# Patient Record
Sex: FEMALE | Race: WHITE | NOT HISPANIC OR LATINO | Employment: OTHER | URBAN - METROPOLITAN AREA
[De-identification: names, ages, dates, MRNs, and addresses within clinical notes are randomized per-mention and may not be internally consistent; named-entity substitution may affect disease eponyms.]

---

## 2019-01-03 ENCOUNTER — OFFICE VISIT (OUTPATIENT)
Dept: ORTHOPEDICS | Facility: CLINIC | Age: 79
End: 2019-01-03
Payer: MEDICARE

## 2019-01-03 VITALS
DIASTOLIC BLOOD PRESSURE: 60 MMHG | WEIGHT: 120 LBS | HEIGHT: 59 IN | BODY MASS INDEX: 24.19 KG/M2 | HEART RATE: 73 BPM | SYSTOLIC BLOOD PRESSURE: 160 MMHG

## 2019-01-03 DIAGNOSIS — M17.12 PRIMARY OSTEOARTHRITIS OF LEFT KNEE: Primary | ICD-10-CM

## 2019-01-03 PROCEDURE — 73562 X-RAY EXAM OF KNEE 3: CPT | Mod: LT,,, | Performed by: ORTHOPAEDIC SURGERY

## 2019-01-03 PROCEDURE — 99203 PR OFFICE/OUTPT VISIT, NEW, LEVL III, 30-44 MIN: ICD-10-PCS | Mod: 25,,, | Performed by: ORTHOPAEDIC SURGERY

## 2019-01-03 PROCEDURE — 20610 LARGE JOINT ASPIRATION/INJECTION: L KNEE: ICD-10-PCS | Mod: LT,,, | Performed by: ORTHOPAEDIC SURGERY

## 2019-01-03 PROCEDURE — 20610 DRAIN/INJ JOINT/BURSA W/O US: CPT | Mod: LT,,, | Performed by: ORTHOPAEDIC SURGERY

## 2019-01-03 PROCEDURE — 73562 PR  X-RAY KNEE 3 VIEW: ICD-10-PCS | Mod: LT,,, | Performed by: ORTHOPAEDIC SURGERY

## 2019-01-03 PROCEDURE — 99203 OFFICE O/P NEW LOW 30 MIN: CPT | Mod: 25,,, | Performed by: ORTHOPAEDIC SURGERY

## 2019-01-03 RX ORDER — LEVOTHYROXINE SODIUM 50 UG/1
50 TABLET ORAL DAILY
COMMUNITY
End: 2019-08-29 | Stop reason: SDUPTHER

## 2019-01-03 RX ORDER — LEVOTHYROXINE SODIUM 75 UG/1
75 TABLET ORAL DAILY
COMMUNITY
End: 2019-08-29 | Stop reason: SDUPTHER

## 2019-01-03 RX ORDER — METHYLPREDNISOLONE ACETATE 40 MG/ML
40 INJECTION, SUSPENSION INTRA-ARTICULAR; INTRALESIONAL; INTRAMUSCULAR; SOFT TISSUE
Status: DISCONTINUED | OUTPATIENT
Start: 2019-01-03 | End: 2019-01-03 | Stop reason: HOSPADM

## 2019-01-03 RX ADMIN — METHYLPREDNISOLONE ACETATE 40 MG: 40 INJECTION, SUSPENSION INTRA-ARTICULAR; INTRALESIONAL; INTRAMUSCULAR; SOFT TISSUE at 11:01

## 2019-01-03 NOTE — PROGRESS NOTES
[facility] ORTHOPEDICS    Subjective:     Chief Complaint:   Chief Complaint   Patient presents with    Left Knee - Pain     Left knee pain and  behind the knee with swelling  since . No injury       Past Medical History:   Diagnosis Date    Thyroid disease        Past Surgical History:   Procedure Laterality Date     SECTION      HYSTERECTOMY      TONSILLECTOMY         Current Outpatient Medications   Medication Sig    levothyroxine (SYNTHROID) 50 MCG tablet Take 50 mcg by mouth once daily.    levothyroxine (SYNTHROID) 75 MCG tablet Take 75 mcg by mouth once daily.     No current facility-administered medications for this visit.        Review of patient's allergies indicates:   Allergen Reactions    Keflex [cephalexin]        History reviewed. No pertinent family history.    Social History     Socioeconomic History    Marital status:      Spouse name: Not on file    Number of children: Not on file    Years of education: Not on file    Highest education level: Not on file   Social Needs    Financial resource strain: Not on file    Food insecurity - worry: Not on file    Food insecurity - inability: Not on file    Transportation needs - medical: Not on file    Transportation needs - non-medical: Not on file   Occupational History    Not on file   Tobacco Use    Smoking status: Never Smoker    Smokeless tobacco: Never Used   Substance and Sexual Activity    Alcohol use: Not on file    Drug use: Not on file    Sexual activity: Not on file   Other Topics Concern    Not on file   Social History Narrative    Not on file       History of present illness: Patient comes in today with a chief complaint of left knee pain. She's had long-standing left knee pain but it's been severe since . Patient has a 2+ effusion. She has a lot of pain with patellar grind's. She is range of motion from 0-115 degrees symmetrically. She has no effusion on the right knee. Straight  leg raises are negative.      Review of Systems:    Constitution: Negative for chills, fever, and sweats.  Negative for unexplained weight loss.    HENT:  Negative for headaches and blurry vision.    Cardiovascular:Negative for chest pain or irregular heart beat. Negative for hypertension.    Respiratory:  Negative for cough and shortness of breath.    Gastrointestinal: Negative for abdominal pain, heartburn, melena, nausea, and vomitting.    Genitourinary:  Negative bladder incontinence and dysuria.    Musculoskeletal:  See HPI for details.     Neurological: Negative for numbness.    Psychiatric/Behavioral: Negative for depression.  The patient is not nervous/anxious.      Endocrine: Negative for polyuria    Hematologic/Lymphatic: Negative for bleeding problem.  Does not bruise/bleed easily.    Skin: Negative for poor would healing and rash    Objective:      Physical Examination:    Vital Signs:    Vitals:    01/03/19 1128   BP: (!) 160/60   Pulse: 73       Body mass index is 24.24 kg/m².    This a well-developed, well nourished patient in no acute distress.  They are alert and oriented and cooperative to examination.        AP lateral and sunrise views of the left knee demonstrate bone-on-bone osteophyte arthritis of the left knee  Pertinent New Results:    XRAY Report / Interpretation:   See above    Assessment/Plan:      *Bone-on-bone osteoarthritis left knee. I injected the left knee with Depo-Medrol and lidocaine. She will follow-up in one month      This note was created using Dragon voice recognition software that occasionally misinterpreted phrases or words.

## 2019-01-03 NOTE — PROCEDURES
Large Joint Aspiration/Injection: L knee  Date/Time: 1/3/2019 11:53 AM  Performed by: Elpidio Ayala MD  Authorized by: Elpidio Ayala MD     Consent Done?:  Yes (Verbal)  Indications:  Pain  Procedure site marked: Yes    Timeout: Prior to procedure the correct patient, procedure, and site was verified      Location:  Knee  Site:  L knee  Prep: Patient was prepped and draped in usual sterile fashion    Needle size:  25 G  Medications:  40 mg methylPREDNISolone acetate 40 mg/mL; 40 mg methylPREDNISolone acetate 40 mg/mL  Patient tolerance:  Patient tolerated the procedure well with no immediate complications

## 2019-04-02 LAB
CHOL/HDLC RATIO: 2.5
CHOLESTEROL, TOTAL: 199
HDLC SERPL-MCNC: 79 MG/DL
LDLC SERPL CALC-MCNC: 106 MG/DL (ref 0–160)
NON HDL CHOL (CALC): 120
TRIGLYCERIDES: 49

## 2019-04-15 ENCOUNTER — PATIENT OUTREACH (OUTPATIENT)
Dept: ADMINISTRATIVE | Facility: HOSPITAL | Age: 79
End: 2019-04-15

## 2019-04-29 ENCOUNTER — LAB VISIT (OUTPATIENT)
Dept: LAB | Facility: HOSPITAL | Age: 79
End: 2019-04-29
Attending: NURSE PRACTITIONER
Payer: MEDICARE

## 2019-04-29 ENCOUNTER — HOSPITAL ENCOUNTER (OUTPATIENT)
Dept: RADIOLOGY | Facility: HOSPITAL | Age: 79
Discharge: HOME | End: 2019-04-29
Attending: NURSE PRACTITIONER
Payer: MEDICARE

## 2019-04-29 ENCOUNTER — OFFICE VISIT (OUTPATIENT)
Dept: FAMILY MEDICINE | Facility: CLINIC | Age: 79
End: 2019-04-29
Payer: MEDICARE

## 2019-04-29 VITALS
HEIGHT: 59 IN | DIASTOLIC BLOOD PRESSURE: 78 MMHG | TEMPERATURE: 98 F | HEART RATE: 80 BPM | OXYGEN SATURATION: 97 % | SYSTOLIC BLOOD PRESSURE: 152 MMHG | BODY MASS INDEX: 24.22 KG/M2 | WEIGHT: 120.13 LBS

## 2019-04-29 DIAGNOSIS — F41.9 ANXIETY: ICD-10-CM

## 2019-04-29 DIAGNOSIS — R03.0 ELEVATED BP WITHOUT DIAGNOSIS OF HYPERTENSION: ICD-10-CM

## 2019-04-29 DIAGNOSIS — R73.03 PRE-DIABETES: ICD-10-CM

## 2019-04-29 DIAGNOSIS — R22.41 LUMP OF RIGHT THIGH: ICD-10-CM

## 2019-04-29 DIAGNOSIS — R79.89 ELEVATED LFTS: ICD-10-CM

## 2019-04-29 DIAGNOSIS — Z76.89 ENCOUNTER TO ESTABLISH CARE: Primary | ICD-10-CM

## 2019-04-29 DIAGNOSIS — E04.1 THYROID NODULE: ICD-10-CM

## 2019-04-29 PROCEDURE — 99999 PR PBB SHADOW E&M-EST. PATIENT-LVL IV: CPT | Mod: PBBFAC,,, | Performed by: NURSE PRACTITIONER

## 2019-04-29 PROCEDURE — 99204 OFFICE O/P NEW MOD 45 MIN: CPT | Mod: S$PBB,,, | Performed by: NURSE PRACTITIONER

## 2019-04-29 PROCEDURE — 76882 PR  US,EXTREMITY,NONVASCULAR,REAL-TIME IMAGE,LIMITED: ICD-10-PCS | Mod: 26,RT,, | Performed by: RADIOLOGY

## 2019-04-29 PROCEDURE — 99204 PR OFFICE/OUTPT VISIT, NEW, LEVL IV, 45-59 MIN: ICD-10-PCS | Mod: S$PBB,,, | Performed by: NURSE PRACTITIONER

## 2019-04-29 PROCEDURE — 36415 COLL VENOUS BLD VENIPUNCTURE: CPT | Mod: PO

## 2019-04-29 PROCEDURE — 99214 OFFICE O/P EST MOD 30 MIN: CPT | Mod: PBBFAC,PO,25 | Performed by: NURSE PRACTITIONER

## 2019-04-29 PROCEDURE — 99999 PR PBB SHADOW E&M-EST. PATIENT-LVL IV: ICD-10-PCS | Mod: PBBFAC,,, | Performed by: NURSE PRACTITIONER

## 2019-04-29 PROCEDURE — 76999 ECHO EXAMINATION PROCEDURE: CPT | Mod: TC

## 2019-04-29 PROCEDURE — 80053 COMPREHEN METABOLIC PANEL: CPT

## 2019-04-29 PROCEDURE — 76882 US LMTD JT/FCL EVL NVASC XTR: CPT | Mod: 26,RT,, | Performed by: RADIOLOGY

## 2019-04-29 NOTE — PROGRESS NOTES
Subjective:       Patient ID: Sho Lennon is a 79 y.o. female.    Chief Complaint: Mass (right leg )    Ms. Lennon presents today to establish care. Previously under the care of Dr. Eduardo. Sees Dr. Jacobsen for endocrinology, most recent visit was 2 weeks ago. Has a thyroid nodule that is checked every 2 years with an ultrasound. This is scheduled for 5/2. Per patient, on synthroid to help prevent the growth of the thyroid nodule. Had recent lab work at [facility], reviewed in detail with the patient. Copy obtained to scan to chart. Has mammogram and DEXA scheduled for 5/2. BP is slightly elevated today. Never diagnosed with HTN. Stress/anxiety at home-  has Parkinson's. Noticed a lump on her right thigh that has been present for years. More prominent if she lifts her leg. It is not painful and has not changed. No skin changes. Reports she had pneumonia vaccines but is unsure of when. Medical/surgical/family history and medications reviewed. Has had multiple skin cancer removals, some requiring Mohs procedures. Also has known pre-diabetes.     Records request from Dr. Eduardo and Dr. Jacobsen.     Patient Active Problem List   Diagnosis    Thyroid nodule    Pre-diabetes    Anxiety     Review of Systems   Constitutional: Negative for activity change, appetite change, fatigue and fever.   Respiratory: Negative for chest tightness, shortness of breath and wheezing.    Cardiovascular: Negative for chest pain, palpitations and leg swelling.   Gastrointestinal: Negative for abdominal pain, diarrhea, nausea and vomiting.   Endocrine: Negative for polydipsia, polyphagia and polyuria.   Genitourinary: Negative for difficulty urinating.   Musculoskeletal: Negative for arthralgias, back pain and myalgias.   Neurological: Negative for dizziness, weakness and light-headedness.   Psychiatric/Behavioral: The patient is nervous/anxious.        Objective:      Physical Exam   Constitutional: She is oriented to person, place, and  time. She appears well-developed and well-nourished.   Cardiovascular: Normal rate, regular rhythm and normal heart sounds.   Pulmonary/Chest: Effort normal and breath sounds normal.   Musculoskeletal: She exhibits no edema.   Neurological: She is alert and oriented to person, place, and time.   Skin: Skin is warm and dry.   Psychiatric: She has a normal mood and affect.   Nursing note and vitals reviewed.      Assessment:       1. Encounter to establish care    2. Elevated LFTs    3. Thyroid nodule    4. Lump of right thigh    5. Pre-diabetes    6. Elevated BP without diagnosis of hypertension    7. Anxiety        Plan:       Sho was seen today for mass.    Diagnoses and all orders for this visit:    Encounter to establish care    Elevated LFTs  -     Comprehensive metabolic panel; Future  Elevated ALT= 32 on labs 4/2, repeat  Denies use of tylenol of excessive alcohol intake    Thyroid nodule  Continue under the care of endocrinology   U/S scheduled for 5/2  Recent TFTs stable    Lump of right thigh  -     US Soft Tissue Misc; Future  Screen and treat as needed    Pre-diabetes  A1C 5.7  Discussed importance of diet and regular exercise to help with blood glucose control    Elevated BP without diagnosis of hypertension  I counseled the patient on HTN education, management and recommendations.  I recommended weight loss toward a BMI < 25, avoidance of salt and the DASH diet, regular cardio exercise a minimum of 150 minutes per week and medications if indicated. The goal is < 140/90 unless otherwise specified.  F/U 4 weeks nurse BP check     Anxiety  Offered medications, psychology referral  Will consider and notify clinic    F/U to establish care with Dr. Lewis

## 2019-04-30 ENCOUNTER — TELEPHONE (OUTPATIENT)
Dept: FAMILY MEDICINE | Facility: CLINIC | Age: 79
End: 2019-04-30

## 2019-04-30 DIAGNOSIS — R22.41 LUMP OF RIGHT THIGH: Primary | ICD-10-CM

## 2019-04-30 LAB
ALBUMIN SERPL BCP-MCNC: 3.8 G/DL (ref 3.5–5.2)
ALP SERPL-CCNC: 62 U/L (ref 55–135)
ALT SERPL W/O P-5'-P-CCNC: 24 U/L (ref 10–44)
ANION GAP SERPL CALC-SCNC: 10 MMOL/L (ref 8–16)
AST SERPL-CCNC: 22 U/L (ref 10–40)
BILIRUB SERPL-MCNC: 0.6 MG/DL (ref 0.1–1)
BUN SERPL-MCNC: 22 MG/DL (ref 8–23)
CALCIUM SERPL-MCNC: 10.1 MG/DL (ref 8.7–10.5)
CHLORIDE SERPL-SCNC: 105 MMOL/L (ref 95–110)
CO2 SERPL-SCNC: 26 MMOL/L (ref 23–29)
CREAT SERPL-MCNC: 0.7 MG/DL (ref 0.5–1.4)
EST. GFR  (AFRICAN AMERICAN): >60 ML/MIN/1.73 M^2
EST. GFR  (NON AFRICAN AMERICAN): >60 ML/MIN/1.73 M^2
GLUCOSE SERPL-MCNC: 110 MG/DL (ref 70–110)
POTASSIUM SERPL-SCNC: 4.1 MMOL/L (ref 3.5–5.1)
PROT SERPL-MCNC: 6.9 G/DL (ref 6–8.4)
SODIUM SERPL-SCNC: 141 MMOL/L (ref 136–145)

## 2019-04-30 NOTE — TELEPHONE ENCOUNTER
----- Message from Iesha Bhandari NP sent at 4/30/2019  8:44 AM CDT -----  U/S showed 3 cm mass along the right thigh, possible lipoma or fat trauma/ necrosis, but overall indeterminate. I have referred her to general surgery for further evaluation.

## 2019-05-01 NOTE — TELEPHONE ENCOUNTER
Left message for patient to return call, sent letter and appointment slip for general surgery appointment.

## 2019-05-01 NOTE — TELEPHONE ENCOUNTER
Spoke with patient regarding her results, patient has been scheduled to see the general surgery on 5/14/ at 3:00pm

## 2019-05-09 ENCOUNTER — CLINICAL SUPPORT (OUTPATIENT)
Dept: URGENT CARE | Facility: CLINIC | Age: 79
End: 2019-05-09
Payer: MEDICARE

## 2019-05-09 VITALS
SYSTOLIC BLOOD PRESSURE: 150 MMHG | HEART RATE: 87 BPM | OXYGEN SATURATION: 98 % | BODY MASS INDEX: 24.27 KG/M2 | WEIGHT: 120.38 LBS | TEMPERATURE: 97 F | HEIGHT: 59 IN | DIASTOLIC BLOOD PRESSURE: 78 MMHG | RESPIRATION RATE: 16 BRPM

## 2019-05-09 DIAGNOSIS — J04.0 LARYNGITIS: ICD-10-CM

## 2019-05-09 DIAGNOSIS — R05.9 COUGH: ICD-10-CM

## 2019-05-09 DIAGNOSIS — R09.82 POST-NASAL DRIP: Primary | ICD-10-CM

## 2019-05-09 PROCEDURE — 99204 PR OFFICE/OUTPT VISIT, NEW, LEVL IV, 45-59 MIN: ICD-10-PCS | Mod: S$GLB,,, | Performed by: NURSE PRACTITIONER

## 2019-05-09 PROCEDURE — 99204 OFFICE O/P NEW MOD 45 MIN: CPT | Mod: S$GLB,,, | Performed by: NURSE PRACTITIONER

## 2019-05-09 RX ORDER — FLUTICASONE PROPIONATE 50 MCG
2 SPRAY, SUSPENSION (ML) NASAL DAILY
Qty: 15.8 ML | Refills: 0 | Status: SHIPPED | OUTPATIENT
Start: 2019-05-09 | End: 2019-08-29 | Stop reason: ALTCHOICE

## 2019-05-09 RX ORDER — CETIRIZINE HYDROCHLORIDE 10 MG/1
10 TABLET ORAL DAILY
Qty: 30 TABLET | Refills: 0 | Status: SHIPPED | OUTPATIENT
Start: 2019-05-09 | End: 2019-05-09 | Stop reason: CLARIF

## 2019-05-09 RX ORDER — CETIRIZINE HYDROCHLORIDE 10 MG/1
10 TABLET ORAL DAILY
Qty: 30 TABLET | Refills: 0 | Status: SHIPPED | OUTPATIENT
Start: 2019-05-09 | End: 2019-06-08

## 2019-05-09 NOTE — PATIENT INSTRUCTIONS
Take your Astelin with the Flonase you are prescribed today.     Laryngitis    Laryngitis is a swelling of the tissues around the vocal cords. Symptoms include a hoarse (scratchy) voice. The voice may be lost completely. It may be caused by a viral illness, such as a head or chest cold. It may also be due to overuse and strain of the voice. Smoking, drinking alcohol, acid reflux, allergies, or inhaling harsh chemicals may also lead to symptoms. This condition will usually resolve in 1-2 weeks.  Home care  · Rest your voice until it recovers. Talk as little as possible. If your symptoms are severe, rest at home for a day or so.  · Breathing cool steam from a humidifier/vaporizer or in a steamy shower may be helpful.  · Drink plenty of fluids to stay well hydrated.  · Do not smoke  Follow-up care  Follow up with your healthcare provider or this facility if you have not improved after one week.  When to seek medical advice  Contact your healthcare provider for any of the following:  · Severe pain with swallowing  · Trouble opening mouth  · Neck swelling, neck pain, or trouble moving neck  · Noisy breathing or trouble breathing  · Fever of 100.4°F (38.ºC) or higher, or as directed by your healthcare provider  · Drooling  · Symptoms do not resolve in 2 weeks  Date Last Reviewed: 4/26/2015  © 5892-0386 [employer]. [address] [place] [address]. All rights reserved. This information is not intended as a substitute for professional medical care. Always follow your healthcare professional's instructions.        When You Have a Sore Throat    A sore throat can be painful. There are many reasons why you may have a sore throat. Your healthcare provider will work with you to find the cause of your sore throat. He or she will also find the best treatment for you.  What causes a sore throat?  Sore throats can be caused or worsened by:  · Cold or flu viruses  · Bacteria  · Irritants such as tobacco  smoke or air pollution  · Acid reflux  A healthy throat  The tonsils are on the sides of the throat near the base of the tongue. They collect viruses and bacteria and help fight infection. The throat (pharynx) is the passage for air. Mucus from the nasal cavity also moves down the passage.  An inflamed throat  The tonsils and pharynx can become inflamed due to a cold or flu virus. Postnasal drip (excess mucus draining from the nasal cavity) can irritate the throat. It can also make the throat or tonsils more likely to be infected by bacteria. Severe, untreated tonsillitis in children or adults can cause a pocket of pus (abscess) to form near the tonsil.  Your evaluation  A medical evaluation can help find the cause of your sore throat. It can also help your healthcare provider choose the best treatment for you. The evaluation may include a health history, physical exam, and diagnostic tests.  Health history  Your healthcare provider may ask you the following:  · How long has the sore throat lasted and how have you been treating it?  · Do you have any other symptoms, such as body aches, fever, or cough?  · Does your sore throat recur? If so, how often? How many days of school or work have you missed because of a sore throat?  · Do you have trouble eating or swallowing?  · Have you been told that you snore or have other sleep problems?  · Do you have bad breath?  · Do you cough up bad-tasting mucus?  Physical exam  During the exam, your healthcare provider checks your ears, nose, and throat for problems. He or she also checks for swelling in the neck, and may listen to your chest.  Possible tests  Other tests your healthcare provider may perform include:  · A throat swab to check for bacteria such as streptococcus (the bacteria that causes strep throat)  · A blood test to check for mononucleosis (a viral infection)  · A chest X-ray to rule out pneumonia, especially if you have a cough  Treating a sore  "throat  Treatment depends on many factors. What is the likely cause? Is the problem recent? Does it keep coming back? In many cases, the best thing to do is to treat the symptoms, rest, and let the problem heal itself. Antibiotics may help clear up some bacterial infections. For cases of severe or recurring tonsillitis, the tonsils may need to be removed.  Relieving your symptoms  · Dont smoke, and avoid secondhand smoke.  · For children, try throat sprays or Popsicles. Adults and older children may try lozenges.  · Drink warm liquids to soothe the throat and help thin mucus. Avoid alcohol, spicy foods, and acidic drinks such as orange juice. These can irritate the throat.  · Gargle with warm saltwater (1 teaspoon of salt to 8 ounces of warm water).  · Use a humidifier to keep air moist and relieve throat dryness.  · Try over-the-counter pain relievers such as acetaminophen or ibuprofen. Use as directed, and dont exceed the recommended dose. Dont give aspirin to children.   Are antibiotics needed?  If your sore throat is due to a bacterial infection, antibiotics may speed healing and prevent complications. Although group A streptococcus ("strep throat" or GAS) is the major treatable infection for a sore throat, GAS causes only 5% to 15% of sore throats in adults who seek medical care. Most sore throats are caused by cold or flu viruses. And antibiotics dont treat viral illness. In fact, using antibiotics when theyre not needed may produce bacteria that are harder to kill. Your healthcare provider will prescribe antibiotics only if he or she thinks they are likely to help.  If antibiotics are prescribed  Take the medicine exactly as directed. Be sure to finish your prescription even if youre feeling better. And be sure to ask your healthcare provider or pharmacist what side effects are common and what to do about them.  Is surgery needed?  In some cases, tonsils need to be removed. This is often done as " outpatient (same-day) surgery. Your healthcare provider may advise removing the tonsils in cases of:  · Several severe bouts of tonsillitis in a year. Severe episodes include those that lead to missed days of school or work, or that need to be treated with antibiotics.  · Tonsillitis that causes breathing problems during sleep  · Tonsillitis caused by food particles collecting in pouches in the tonsils (cryptic tonsillitis)  Call your healthcare provider if any of the following occur:  · Symptoms worsen, or new symptoms develop.  · Swollen tonsils make breathing difficult.  · The pain is severe enough to keep you from drinking liquids.  · A skin rash, hives, or wheezing develops. Any of these could signal an allergic reaction to antibiotics.  · Symptoms dont improve within a week.  · Symptoms dont improve within 2 to 3 days of starting antibiotics.   Date Last Reviewed: 10/1/2016  © 9618-4695 [employer]. [address] [place] [address]. All rights reserved. This information is not intended as a substitute for professional medical care. Always follow your healthcare professional's instructions.        Self-Care for Sore Throats    Sore throats happen for many reasons, such as colds, allergies, and infections caused by viruses or bacteria. In any case, your throat becomes red and sore. Your goal for self-care is to reduce your discomfort while giving your throat a chance to heal.  Moisten and soothe your throat  Tips include the following:  · Try a sip of water first thing after waking up.  · Keep your throat moist by drinking 6 or more glasses of clear liquids every day.  · Run a cool-air humidifier in your room overnight.  · Avoid cigarette smoke.   · Suck on throat lozenges, cough drops, hard candy, ice chips, or frozen fruit-juice bars. Use the sugar-free versions if your diet or medical condition requires them.  Gargle to ease irritation  Gargling every hour or 2 can ease irritation.  Try gargling with 1 of these solutions:  · 1/4 teaspoon of salt in 1/2 cup of warm water  · An over-the-counter anesthetic gargle  Use medicine for more relief  Over-the-counter medicine can reduce sore throat symptoms. Ask your pharmacist if you have questions about which medicine to use:  · Ease pain with anesthetic sprays. Aspirin or an aspirin substitute also helps. Remember, never give aspirin to anyone 18 or younger, or if you are already taking blood thinners.   · For sore throats caused by allergies, try antihistamines to block the allergic reaction.  · Remember: unless a sore throat is caused by a bacterial infection, antibiotics wont help you.  Prevent future sore throats  Prevention tips include the following:  · Stop smoking or reduce contact with secondhand smoke. Smoke irritates the tender throat lining.  · Limit contact with pets and with allergy-causing substances, such as pollen and mold.  · When youre around someone with a sore throat or cold, wash your hands often to keep viruses or bacteria from spreading.  · Dont strain your vocal cords.  Call your healthcare provider  Contact your healthcare provider if you have:  · A temperature over 101°F (38.3°C)  · White spots on the throat  · Great difficulty swallowing  · Trouble breathing  · A skin rash  · Recent exposure to someone else with strep bacteria  · Severe hoarseness and swollen glands in the neck or jaw   Date Last Reviewed: 8/1/2016  © 6106-3367 [employer]. [address] [place] [address], [place] [address]. All rights reserved. This information is not intended as a substitute for professional medical care. Always follow your healthcare professional's instructions.

## 2019-05-09 NOTE — PROGRESS NOTES
"Subjective:       Patient ID: Sho Lennon is a 79 y.o. female.    Vitals:  height is 4' 11" (1.499 m) and weight is 54.6 kg (120 lb 6.4 oz). Her oral temperature is 97.3 °F (36.3 °C). Her blood pressure is 150/78 (abnormal) and her pulse is 87. Her respiration is 16 and oxygen saturation is 98%.     Chief Complaint: Cough (since monday)    Patient complains of sore throat since yesterday. Also reports loss of voice and nonproductive cough. Denies sob, wheezing, chest pain, sinus congestion, nausea, vomiting, diarrhea.     Cough   This is a new problem. The current episode started in the past 7 days. The problem has been gradually worsening. The problem occurs constantly. The cough is non-productive. Associated symptoms include postnasal drip and a sore throat. Pertinent negatives include no chest pain, chills, fever, headaches, myalgias, rash or shortness of breath. Nothing aggravates the symptoms. She has tried OTC cough suppressant for the symptoms. The treatment provided no relief.       Constitution: Negative for chills, fatigue and fever.   HENT: Positive for postnasal drip, sore throat and voice change. Negative for congestion.    Neck: Negative for painful lymph nodes.   Cardiovascular: Negative for chest pain and leg swelling.   Eyes: Negative for double vision and blurred vision.   Respiratory: Positive for cough. Negative for shortness of breath.    Gastrointestinal: Negative for abdominal pain, nausea, vomiting and diarrhea.   Genitourinary: Negative for dysuria, frequency, urgency and history of kidney stones.   Musculoskeletal: Negative for joint pain, joint swelling, muscle cramps and muscle ache.   Skin: Negative for color change, pale, rash and bruising.   Allergic/Immunologic: Negative for seasonal allergies.   Neurological: Negative for dizziness, history of vertigo, light-headedness, passing out and headaches.   Hematologic/Lymphatic: Negative for swollen lymph nodes.   Psychiatric/Behavioral: " Negative for nervous/anxious, sleep disturbance and depression. The patient is not nervous/anxious.        Objective:      Physical Exam   Constitutional: She is oriented to person, place, and time. Vital signs are normal. She appears well-developed and well-nourished. She is cooperative.  Non-toxic appearance. She does not have a sickly appearance. She does not appear ill. No distress.   HENT:   Head: Normocephalic and atraumatic.   Right Ear: Hearing, tympanic membrane, external ear and ear canal normal.   Left Ear: Hearing, tympanic membrane, external ear and ear canal normal.   Nose: Rhinorrhea present. No mucosal edema or nasal deformity. No epistaxis. Right sinus exhibits no maxillary sinus tenderness and no frontal sinus tenderness. Left sinus exhibits no maxillary sinus tenderness and no frontal sinus tenderness.   Mouth/Throat: Uvula is midline and mucous membranes are normal. No trismus in the jaw. Normal dentition. No uvula swelling. Posterior oropharyngeal erythema present.   Post nasal drip noted   Eyes: Conjunctivae and lids are normal. Right eye exhibits no discharge. Left eye exhibits no discharge. No scleral icterus.   Sclera clear bilat   Neck: Trachea normal, normal range of motion, full passive range of motion without pain and phonation normal. Neck supple.   Cardiovascular: Normal rate, regular rhythm, normal heart sounds, intact distal pulses and normal pulses.   Pulmonary/Chest: Effort normal and breath sounds normal. No respiratory distress.   Abdominal: Soft. Normal appearance and bowel sounds are normal. She exhibits no distension, no pulsatile midline mass and no mass. There is no tenderness.   Musculoskeletal: Normal range of motion. She exhibits no edema or deformity.   Neurological: She is alert and oriented to person, place, and time. She exhibits normal muscle tone. Coordination normal. GCS eye subscore is 4. GCS verbal subscore is 5. GCS motor subscore is 6.   Skin: Skin is warm, dry  and intact. No rash noted. She is not diaphoretic. No pallor.   Psychiatric: She has a normal mood and affect. Her speech is normal and behavior is normal. Judgment and thought content normal. Cognition and memory are normal.   Nursing note and vitals reviewed.      Assessment:       1. Post-nasal drip    2. Laryngitis    3. Cough        Plan:       Symptoms suggestive of viral illness, will treat at home symptomatically.  F/U with PCP if symptoms do not improve in 3 days.  Verbalizes understanding they may return for any worsening or change in symptoms.      Post-nasal drip    Laryngitis    Cough    Other orders  -     Discontinue: cetirizine (ZYRTEC) 10 MG tablet; Take 1 tablet (10 mg total) by mouth once daily.  Dispense: 30 tablet; Refill: 0  -     fluticasone propionate (FLONASE) 50 mcg/actuation nasal spray; 2 sprays (100 mcg total) by Each Nare route once daily.  Dispense: 15.8 mL; Refill: 0  -     dexchlorphen-phenylephrine-DM (POLYTUSSIN DM) 1-5-10 mg/5 mL Syrp; Take 5 mLs by mouth every 4 (four) hours as needed.  Dispense: 120 mL; Refill: 0  -     cetirizine (ZYRTEC) 10 MG tablet; Take 1 tablet (10 mg total) by mouth once daily.  Dispense: 30 tablet; Refill: 0

## 2019-05-14 ENCOUNTER — OFFICE VISIT (OUTPATIENT)
Dept: SURGERY | Facility: CLINIC | Age: 79
End: 2019-05-14
Payer: MEDICARE

## 2019-05-14 VITALS
HEIGHT: 59 IN | RESPIRATION RATE: 16 BRPM | BODY MASS INDEX: 23.87 KG/M2 | TEMPERATURE: 98 F | HEART RATE: 94 BPM | DIASTOLIC BLOOD PRESSURE: 81 MMHG | SYSTOLIC BLOOD PRESSURE: 155 MMHG | WEIGHT: 118.38 LBS

## 2019-05-14 DIAGNOSIS — R22.9 SUBCUTANEOUS MASS: Primary | ICD-10-CM

## 2019-05-14 PROCEDURE — 99999 PR PBB SHADOW E&M-EST. PATIENT-LVL III: ICD-10-PCS | Mod: PBBFAC,,, | Performed by: SURGERY

## 2019-05-14 PROCEDURE — 99999 PR PBB SHADOW E&M-EST. PATIENT-LVL III: CPT | Mod: PBBFAC,,, | Performed by: SURGERY

## 2019-05-14 PROCEDURE — 99203 PR OFFICE/OUTPT VISIT, NEW, LEVL III, 30-44 MIN: ICD-10-PCS | Mod: S$PBB,,, | Performed by: SURGERY

## 2019-05-14 PROCEDURE — 99203 OFFICE O/P NEW LOW 30 MIN: CPT | Mod: S$PBB,,, | Performed by: SURGERY

## 2019-05-14 PROCEDURE — 99213 OFFICE O/P EST LOW 20 MIN: CPT | Mod: PBBFAC,PO | Performed by: SURGERY

## 2019-05-14 NOTE — Clinical Note
I saw your patient, Sho Lennon in the office.  Attached are my findings and plan.  Thank you for referring her to my office and if you have any questions please do not hesitate to call my cell [phone].Alfredo Menendez

## 2019-05-14 NOTE — PATIENT INSTRUCTIONS
Surgery is scheduled for 05/20/19 arrival time per the preop nurse.  Preop will call from [phone]  Fasting after midnight  Someone to drive you home      THE PREOP NURSE WILL CALL, SOMETIMES AS LATE AS 4 PM IN THE AFTERNOON THE DAY BEFORE SURGERY.    Bathe the night before and the morning of your procedure with a Chlorhexidine wash such as Hibiclens, can be purchased at most Pharmacy's.    Special Instruction:

## 2019-05-14 NOTE — LETTER
May 15, 2019      Iesha Bhandari, NP  [address]  [facility] [address]           [facility] - General Surgery  [address]  [facility] [address]  Phone: [phone]          Patient: Sho Lennon   MR Number: [id number]   YOB: 1940   Date of Visit: 5/14/2019       Dear Iesha Bhandari:    Thank you for referring Sho Lennon to me for evaluation. Attached you will find relevant portions of my assessment and plan of care.    If you have questions, please do not hesitate to call me. I look forward to following Sho Lennon along with you.    Sincerely,    Long Menendez MD    Enclosure  CC:  No Recipients    If you would like to receive this communication electronically, please contact [email or web address] or [phone] to request more information on [employer] Link access.    For providers and/or their staff who would like to refer a patient to [facility], please contact us through our one-stop-shop provider referral line, [facility], at [phone].    If you feel you have received this communication in error or would no longer like to receive these types of communications, please e-mail [email or web address]

## 2019-05-15 RX ORDER — LIDOCAINE HYDROCHLORIDE 10 MG/ML
1 INJECTION, SOLUTION EPIDURAL; INFILTRATION; INTRACAUDAL; PERINEURAL ONCE
Status: CANCELLED | OUTPATIENT
Start: 2019-05-15 | End: 2019-05-15

## 2019-05-15 RX ORDER — SODIUM CHLORIDE 9 MG/ML
INJECTION, SOLUTION INTRAVENOUS CONTINUOUS
Status: CANCELLED | OUTPATIENT
Start: 2019-05-15

## 2019-05-15 NOTE — PROGRESS NOTES
Subjective:       Patient ID: Sho Lennon is a 79 y.o. female.    Chief Complaint: Consult (lump on right thigh)    HPI  Pleasant 80 yo F referred to me in consultation from Iesha Bhandari for evaluation of lump on her R leg.  Pt notes that lesion has been present for years.  She states that in recent months it has become more painful. She denies any drainage from the area.  No n/v.  No fever/chills. Pt notes that she had an US demonstrating findings suggestive of lipoma.  Given its increasing size and tenderness she desires to have it removed.  Pt is otherwise without complaint.  NO significant PShx.  Pt otherwise healhty  Review of Systems   Constitutional: Negative for activity change, appetite change, fever and unexpected weight change.   Respiratory: Negative for chest tightness, shortness of breath and wheezing.    Cardiovascular: Negative for chest pain.   Gastrointestinal: Negative for abdominal distention, abdominal pain, constipation, diarrhea, nausea and vomiting.   Genitourinary: Negative for difficulty urinating, dysuria and frequency.   Skin: Negative for wound.   Neurological: Negative for dizziness.   Hematological: Negative for adenopathy.   Psychiatric/Behavioral: Negative for agitation.       Objective:      Physical Exam   Constitutional: She is oriented to person, place, and time. She appears well-developed and well-nourished.   HENT:   Head: Normocephalic and atraumatic.   Eyes: Pupils are equal, round, and reactive to light.   Neck: Normal range of motion. Neck supple. No tracheal deviation present. No thyromegaly present.   Cardiovascular: Normal rate, regular rhythm and normal heart sounds.   No murmur heard.  Pulmonary/Chest: Effort normal and breath sounds normal. She exhibits no tenderness.   Abdominal: Soft. Bowel sounds are normal. She exhibits no distension, no abdominal bruit, no pulsatile midline mass and no mass. There is no hepatosplenomegaly. There is no tenderness. There is no  rigidity, no rebound, no guarding, no tenderness at McBurney's point and negative Nguyen's sign. No hernia. Hernia confirmed negative in the ventral area.   Genitourinary: Rectum normal.   Musculoskeletal: Normal range of motion.   Neurological: She is alert and oriented to person, place, and time.   Skin: Skin is warm. No rash noted. No erythema.        Psychiatric: She has a normal mood and affect.   Vitals reviewed.        US; 4/29    FINDINGS:  Targeted ultrasound reveals a 3.1 x 2 cm mass.  This is isoechoic to adjacent tissue and demonstrates no posterior shadowing or increased through transmission. Some margins are well-circumscribed. Others are somewhat difficult to discern due to isoechoic nature of lesion.  Assessment:     Soft tissue mass of R thigh  No diagnosis found.    Plan:       D/w pt.  I have informed pt and her  that this is most likely a benign lesion.  I have offered surgical removal which they desire.  Risks and benefits were d/w pt and informed consent obtained.  Surgery scheduled for Monday

## 2019-05-17 ENCOUNTER — HOSPITAL ENCOUNTER (OUTPATIENT)
Dept: PREADMISSION TESTING | Facility: HOSPITAL | Age: 79
Discharge: HOME | End: 2019-05-17
Attending: SURGERY
Payer: MEDICARE

## 2019-05-17 ENCOUNTER — ANESTHESIA EVENT (OUTPATIENT)
Dept: SURGERY | Facility: HOSPITAL | Age: 79
End: 2019-05-17
Payer: MEDICARE

## 2019-05-17 ENCOUNTER — HOSPITAL ENCOUNTER (OUTPATIENT)
Dept: RADIOLOGY | Facility: HOSPITAL | Age: 79
Discharge: HOME | End: 2019-05-17
Attending: SURGERY
Payer: MEDICARE

## 2019-05-17 VITALS — HEIGHT: 59 IN | BODY MASS INDEX: 24.19 KG/M2 | WEIGHT: 120 LBS

## 2019-05-17 DIAGNOSIS — R22.9 SUBCUTANEOUS MASS: ICD-10-CM

## 2019-05-17 LAB
BASOPHILS # BLD AUTO: 0 K/UL (ref 0–0.2)
BASOPHILS NFR BLD: 0.3 % (ref 0–1.9)
DIFFERENTIAL METHOD: ABNORMAL
EOSINOPHIL # BLD AUTO: 0.1 K/UL (ref 0–0.5)
EOSINOPHIL NFR BLD: 1 % (ref 0–8)
ERYTHROCYTE [DISTWIDTH] IN BLOOD BY AUTOMATED COUNT: 13.6 % (ref 11.5–14.5)
HCT VFR BLD AUTO: 38.2 % (ref 37–48.5)
HGB BLD-MCNC: 12.5 G/DL (ref 12–16)
LYMPHOCYTES # BLD AUTO: 2 K/UL (ref 1–4.8)
LYMPHOCYTES NFR BLD: 23.3 % (ref 18–48)
MCH RBC QN AUTO: 29.8 PG (ref 27–31)
MCHC RBC AUTO-ENTMCNC: 32.8 G/DL (ref 32–36)
MCV RBC AUTO: 91 FL (ref 82–98)
MONOCYTES # BLD AUTO: 0.7 K/UL (ref 0.3–1)
MONOCYTES NFR BLD: 8.1 % (ref 4–15)
NEUTROPHILS # BLD AUTO: 5.9 K/UL (ref 1.8–7.7)
NEUTROPHILS NFR BLD: 67.3 % (ref 38–73)
PLATELET # BLD AUTO: 218 K/UL (ref 150–350)
PMV BLD AUTO: 9.1 FL (ref 9.2–12.9)
RBC # BLD AUTO: 4.21 M/UL (ref 4–5.4)
WBC # BLD AUTO: 8.8 K/UL (ref 3.9–12.7)

## 2019-05-17 PROCEDURE — 71046 X-RAY EXAM CHEST 2 VIEWS: CPT | Mod: 26,,, | Performed by: RADIOLOGY

## 2019-05-17 PROCEDURE — 71046 XR CHEST PA AND LATERAL: ICD-10-PCS | Mod: 26,,, | Performed by: RADIOLOGY

## 2019-05-17 PROCEDURE — 85025 COMPLETE CBC W/AUTO DIFF WBC: CPT

## 2019-05-17 PROCEDURE — 36415 COLL VENOUS BLD VENIPUNCTURE: CPT

## 2019-05-17 PROCEDURE — 93010 EKG 12-LEAD: ICD-10-PCS | Mod: ,,, | Performed by: INTERNAL MEDICINE

## 2019-05-17 PROCEDURE — 93005 ELECTROCARDIOGRAM TRACING: CPT

## 2019-05-17 PROCEDURE — 99900104 DSU ONLY-NO CHARGE-EA ADD'L HR (STAT)

## 2019-05-17 PROCEDURE — 71046 X-RAY EXAM CHEST 2 VIEWS: CPT | Mod: TC,FY

## 2019-05-17 PROCEDURE — 99900103 DSU ONLY-NO CHARGE-INITIAL HR (STAT)

## 2019-05-17 PROCEDURE — 93010 ELECTROCARDIOGRAM REPORT: CPT | Mod: ,,, | Performed by: INTERNAL MEDICINE

## 2019-05-17 NOTE — DISCHARGE INSTRUCTIONS
To confirm, Your doctor has instructed you that surgery is scheduled for:  5/20/19     Dora [phone]    Please report to [facility] the morning of surgery. You must check-in and receive a wristband before going to your procedure.    Final arrival time. 7:00am         Phone number: [phone]      PLEASE NOTE:  The surgery schedule has many variables which may affect the time of your surgery case.  Family members should be available if your surgery time changes.  Plan to be here the day of your procedure between 4-6 hours.    MEDICATIONS:  TAKE ONLY THESE MEDICATIONS WITH A SMALL SIP OF WATER THE MORNING OF YOUR PROCEDURE:  Flonase    DO NOT TAKE THESE MEDICATIONS 5-7 DAYS PRIOR to your procedure or per your surgeon's request: ASPIRIN, ALEVE, ADVIL, IBUPROFEN, FISH OIL VITAMIN E, HERBALS  (May take Tylenol)    ONLY if you are prescribed any types of blood thinners such as:  Aspirin, Coumadin, Plavix, Pradaxa, Xarelto, Aggrenox, Effient, Eliquis, Savasya, Brilinta, or any other, ask your surgeon whether you should stop taking them and how long before surgery you should stop.  You may also need to verify with the prescribing physician if it is ok to stop your medication.      INSTRUCTIONS IMPORTANT!!  · Do not eat or drink anything between midnight and the time of your procedure- this includes gum, mints, and candy.  · Do not smoke or drink alcoholic beverages 24 hours prior to your procedure.  · Shower the night before AND the morning of your procedure with a Chlorhexidine wash such as Hibiclens or Dial antibacterial soap from the neck down.  Do not get it on your face or in your eyes.  You may use your own shampoo and face wash. This helps your skin to be as bacteria free as possible.    · If you wear contact lenses, dentures, hearing aids or glasses, bring a container to put them in during surgery and give to a family member for safe keeping.  Please leave all jewelry,  piercing's and valuables at home.   · DO NOT remove hair from the surgery site.  Do not shave the incision site unless you are given specific instructions to do so.    · ONLY if you have been diagnosed with sleep apnea please bring your C-PAP machine.  · ONLY if you wear home oxygen please bring your portable oxygen tank the day of your procedure.  · ONLY if you have a history of OPEN HEART SURGERY you will need a clearance from your Cardiologist per Anesthesia.      · ONLY for patients requiring bowel prep, written instructions will be given by your doctor's office.  · ONLY if you have a neuro stimulator, please bring the controller with you the morning of surgery  · ONLY if a type and screen test is needed before surgery, please return:  · If your doctor has scheduled you for an overnight stay, bring a small overnight bag with any personal items you need.  · Make arrangements in advance for transportation home by a responsible adult.  It is not safe to drive a vehicle during the 24 hours after anesthesia.      · Visiting hours are 10:00AM to 8:30PM.  For the safety of all patients, visitors under the age of 12 are not allowed above the first floor of the hospital.    · All [facility] facilities and properties are tobacco free.  Smoking is NOT allowed.       If you have any questions about these instructions, call Pre-Op Admit  Nursing at [phone] or the Pre-Op Day Surgery Unit at [phone].

## 2019-05-20 ENCOUNTER — ANESTHESIA (OUTPATIENT)
Dept: SURGERY | Facility: HOSPITAL | Age: 79
End: 2019-05-20
Payer: MEDICARE

## 2019-05-20 ENCOUNTER — HOSPITAL ENCOUNTER (OUTPATIENT)
Facility: HOSPITAL | Age: 79
Discharge: HOME | End: 2019-05-20
Attending: SURGERY | Admitting: SURGERY
Payer: MEDICARE

## 2019-05-20 VITALS
WEIGHT: 120 LBS | SYSTOLIC BLOOD PRESSURE: 172 MMHG | DIASTOLIC BLOOD PRESSURE: 79 MMHG | TEMPERATURE: 99 F | RESPIRATION RATE: 16 BRPM | OXYGEN SATURATION: 100 % | HEIGHT: 59 IN | HEART RATE: 76 BPM | BODY MASS INDEX: 24.19 KG/M2

## 2019-05-20 DIAGNOSIS — R22.9 SUBCUTANEOUS MASS: ICD-10-CM

## 2019-05-20 PROCEDURE — S0077 INJECTION, CLINDAMYCIN PHOSP: HCPCS | Performed by: SURGERY

## 2019-05-20 PROCEDURE — 71000016 HC POSTOP RECOV ADDL HR: Performed by: SURGERY

## 2019-05-20 PROCEDURE — 25000003 PHARM REV CODE 250: Performed by: SURGERY

## 2019-05-20 PROCEDURE — D9220A PRA ANESTHESIA: Mod: CRNA,,, | Performed by: NURSE ANESTHETIST, CERTIFIED REGISTERED

## 2019-05-20 PROCEDURE — 63600175 PHARM REV CODE 636 W HCPCS: Performed by: NURSE ANESTHETIST, CERTIFIED REGISTERED

## 2019-05-20 PROCEDURE — 25000003 PHARM REV CODE 250: Performed by: ANESTHESIOLOGY

## 2019-05-20 PROCEDURE — 27337 EXC THIGH/KNEE LES SC 3 CM/>: CPT | Mod: RT,,, | Performed by: SURGERY

## 2019-05-20 PROCEDURE — 88307 TISSUE EXAM BY PATHOLOGIST: CPT | Performed by: PATHOLOGY

## 2019-05-20 PROCEDURE — 27337 PR EXCISON TUMOR SOFT TISSUE THIGH/KNEE SUBQ 3+CM: ICD-10-PCS | Mod: RT,,, | Performed by: SURGERY

## 2019-05-20 PROCEDURE — 88307 TISSUE EXAM BY PATHOLOGIST: CPT | Mod: 26,,, | Performed by: PATHOLOGY

## 2019-05-20 PROCEDURE — 37000008 HC ANESTHESIA 1ST 15 MINUTES: Performed by: SURGERY

## 2019-05-20 PROCEDURE — 36000707: Performed by: SURGERY

## 2019-05-20 PROCEDURE — D9220A PRA ANESTHESIA: ICD-10-PCS | Mod: ANES,,, | Performed by: ANESTHESIOLOGY

## 2019-05-20 PROCEDURE — 27200651 HC AIRWAY, LMA: Performed by: NURSE ANESTHETIST, CERTIFIED REGISTERED

## 2019-05-20 PROCEDURE — 88307 TISSUE SPECIMEN TO PATHOLOGY - SURGERY: ICD-10-PCS | Mod: 26,,, | Performed by: PATHOLOGY

## 2019-05-20 PROCEDURE — D9220A PRA ANESTHESIA: ICD-10-PCS | Mod: CRNA,,, | Performed by: NURSE ANESTHETIST, CERTIFIED REGISTERED

## 2019-05-20 PROCEDURE — D9220A PRA ANESTHESIA: Mod: ANES,,, | Performed by: ANESTHESIOLOGY

## 2019-05-20 PROCEDURE — 71000015 HC POSTOP RECOV 1ST HR: Performed by: SURGERY

## 2019-05-20 PROCEDURE — 36000706: Performed by: SURGERY

## 2019-05-20 PROCEDURE — 71000033 HC RECOVERY, INTIAL HOUR: Performed by: SURGERY

## 2019-05-20 PROCEDURE — 99900104 DSU ONLY-NO CHARGE-EA ADD'L HR (STAT): Performed by: SURGERY

## 2019-05-20 PROCEDURE — 37000009 HC ANESTHESIA EA ADD 15 MINS: Performed by: SURGERY

## 2019-05-20 PROCEDURE — 99900103 DSU ONLY-NO CHARGE-INITIAL HR (STAT): Performed by: SURGERY

## 2019-05-20 RX ORDER — LIDOCAINE HYDROCHLORIDE 10 MG/ML
1 INJECTION, SOLUTION EPIDURAL; INFILTRATION; INTRACAUDAL; PERINEURAL ONCE
Status: COMPLETED | OUTPATIENT
Start: 2019-05-20 | End: 2019-05-20

## 2019-05-20 RX ORDER — DIPHENHYDRAMINE HYDROCHLORIDE 50 MG/ML
25 INJECTION INTRAMUSCULAR; INTRAVENOUS EVERY 6 HOURS PRN
Status: DISCONTINUED | OUTPATIENT
Start: 2019-05-20 | End: 2019-05-20 | Stop reason: HOSPADM

## 2019-05-20 RX ORDER — HYDROCODONE BITARTRATE AND ACETAMINOPHEN 5; 325 MG/1; MG/1
1 TABLET ORAL EVERY 6 HOURS PRN
Qty: 20 TABLET | Refills: 0 | Status: SHIPPED | OUTPATIENT
Start: 2019-05-20 | End: 2019-08-29 | Stop reason: ALTCHOICE

## 2019-05-20 RX ORDER — KETOROLAC TROMETHAMINE 30 MG/ML
INJECTION, SOLUTION INTRAMUSCULAR; INTRAVENOUS
Status: DISCONTINUED | OUTPATIENT
Start: 2019-05-20 | End: 2019-05-20

## 2019-05-20 RX ORDER — ONDANSETRON 2 MG/ML
4 INJECTION INTRAMUSCULAR; INTRAVENOUS DAILY PRN
Status: DISCONTINUED | OUTPATIENT
Start: 2019-05-20 | End: 2019-05-20 | Stop reason: HOSPADM

## 2019-05-20 RX ORDER — DEXAMETHASONE SODIUM PHOSPHATE 4 MG/ML
INJECTION, SOLUTION INTRA-ARTICULAR; INTRALESIONAL; INTRAMUSCULAR; INTRAVENOUS; SOFT TISSUE
Status: DISCONTINUED | OUTPATIENT
Start: 2019-05-20 | End: 2019-05-20

## 2019-05-20 RX ORDER — SODIUM CHLORIDE 9 MG/ML
INJECTION, SOLUTION INTRAVENOUS CONTINUOUS
Status: CANCELLED | OUTPATIENT
Start: 2019-05-20

## 2019-05-20 RX ORDER — PROPOFOL 10 MG/ML
VIAL (ML) INTRAVENOUS
Status: DISCONTINUED | OUTPATIENT
Start: 2019-05-20 | End: 2019-05-20

## 2019-05-20 RX ORDER — SODIUM CHLORIDE 0.9 % (FLUSH) 0.9 %
3 SYRINGE (ML) INJECTION
Status: DISCONTINUED | OUTPATIENT
Start: 2019-05-20 | End: 2019-05-20 | Stop reason: HOSPADM

## 2019-05-20 RX ORDER — ONDANSETRON 2 MG/ML
4 INJECTION INTRAMUSCULAR; INTRAVENOUS EVERY 12 HOURS PRN
Status: CANCELLED | OUTPATIENT
Start: 2019-05-20

## 2019-05-20 RX ORDER — ONDANSETRON HYDROCHLORIDE 2 MG/ML
INJECTION, SOLUTION INTRAMUSCULAR; INTRAVENOUS
Status: DISCONTINUED | OUTPATIENT
Start: 2019-05-20 | End: 2019-05-20

## 2019-05-20 RX ORDER — FENTANYL CITRATE 50 UG/ML
25 INJECTION, SOLUTION INTRAMUSCULAR; INTRAVENOUS EVERY 5 MIN PRN
Status: DISCONTINUED | OUTPATIENT
Start: 2019-05-20 | End: 2019-05-20 | Stop reason: HOSPADM

## 2019-05-20 RX ORDER — BUPIVACAINE HYDROCHLORIDE AND EPINEPHRINE 2.5; 5 MG/ML; UG/ML
INJECTION, SOLUTION EPIDURAL; INFILTRATION; INTRACAUDAL; PERINEURAL
Status: DISCONTINUED | OUTPATIENT
Start: 2019-05-20 | End: 2019-05-20 | Stop reason: HOSPADM

## 2019-05-20 RX ORDER — LIDOCAINE HYDROCHLORIDE 10 MG/ML
5 INJECTION, SOLUTION EPIDURAL; INFILTRATION; INTRACAUDAL; PERINEURAL ONCE
Status: DISCONTINUED | OUTPATIENT
Start: 2019-05-20 | End: 2019-05-20

## 2019-05-20 RX ORDER — OXYCODONE HYDROCHLORIDE 5 MG/1
5 TABLET ORAL
Status: DISCONTINUED | OUTPATIENT
Start: 2019-05-20 | End: 2019-05-20 | Stop reason: HOSPADM

## 2019-05-20 RX ORDER — PSEUDOEPHEDRINE HCL 30 MG
60 TABLET ORAL EVERY 4 HOURS PRN
COMMUNITY

## 2019-05-20 RX ORDER — HYDROMORPHONE HYDROCHLORIDE 2 MG/ML
0.2 INJECTION, SOLUTION INTRAMUSCULAR; INTRAVENOUS; SUBCUTANEOUS EVERY 5 MIN PRN
Status: DISCONTINUED | OUTPATIENT
Start: 2019-05-20 | End: 2019-05-20 | Stop reason: HOSPADM

## 2019-05-20 RX ORDER — SODIUM CHLORIDE, SODIUM LACTATE, POTASSIUM CHLORIDE, CALCIUM CHLORIDE 600; 310; 30; 20 MG/100ML; MG/100ML; MG/100ML; MG/100ML
INJECTION, SOLUTION INTRAVENOUS CONTINUOUS
Status: DISCONTINUED | OUTPATIENT
Start: 2019-05-20 | End: 2019-05-20 | Stop reason: HOSPADM

## 2019-05-20 RX ORDER — CLINDAMYCIN PHOSPHATE 900 MG/50ML
900 INJECTION, SOLUTION INTRAVENOUS
Status: DISCONTINUED | OUTPATIENT
Start: 2019-05-20 | End: 2019-05-20 | Stop reason: HOSPADM

## 2019-05-20 RX ORDER — LIDOCAINE HCL/PF 100 MG/5ML
SYRINGE (ML) INTRAVENOUS
Status: DISCONTINUED | OUTPATIENT
Start: 2019-05-20 | End: 2019-05-20

## 2019-05-20 RX ORDER — SODIUM CHLORIDE 0.9 % (FLUSH) 0.9 %
3 SYRINGE (ML) INJECTION EVERY 8 HOURS
Status: DISCONTINUED | OUTPATIENT
Start: 2019-05-20 | End: 2019-05-20 | Stop reason: HOSPADM

## 2019-05-20 RX ORDER — MEPERIDINE HYDROCHLORIDE 50 MG/ML
12.5 INJECTION INTRAMUSCULAR; INTRAVENOUS; SUBCUTANEOUS ONCE AS NEEDED
Status: DISCONTINUED | OUTPATIENT
Start: 2019-05-20 | End: 2019-05-20 | Stop reason: HOSPADM

## 2019-05-20 RX ORDER — FENTANYL CITRATE 50 UG/ML
INJECTION, SOLUTION INTRAMUSCULAR; INTRAVENOUS
Status: DISCONTINUED | OUTPATIENT
Start: 2019-05-20 | End: 2019-05-20

## 2019-05-20 RX ADMIN — CLINDAMYCIN PHOSPHATE 900 MG: 18 INJECTION, SOLUTION INTRAVENOUS at 08:05

## 2019-05-20 RX ADMIN — OXYCODONE HYDROCHLORIDE 5 MG: 5 TABLET ORAL at 09:05

## 2019-05-20 RX ADMIN — FENTANYL CITRATE 50 MCG: 50 INJECTION, SOLUTION INTRAMUSCULAR; INTRAVENOUS at 08:05

## 2019-05-20 RX ADMIN — ONDANSETRON 4 MG: 2 INJECTION, SOLUTION INTRAMUSCULAR; INTRAVENOUS at 08:05

## 2019-05-20 RX ADMIN — LIDOCAINE HYDROCHLORIDE: 10 INJECTION, SOLUTION EPIDURAL; INFILTRATION; INTRACAUDAL; PERINEURAL at 08:05

## 2019-05-20 RX ADMIN — LIDOCAINE HYDROCHLORIDE 50 MG: 20 INJECTION, SOLUTION INTRAVENOUS at 08:05

## 2019-05-20 RX ADMIN — KETOROLAC TROMETHAMINE 15 MG: 30 INJECTION, SOLUTION INTRAMUSCULAR; INTRAVENOUS at 08:05

## 2019-05-20 RX ADMIN — PROPOFOL 120 MG: 10 INJECTION, EMULSION INTRAVENOUS at 08:05

## 2019-05-20 RX ADMIN — DEXAMETHASONE SODIUM PHOSPHATE 4 MG: 4 INJECTION, SOLUTION INTRAMUSCULAR; INTRAVENOUS at 08:05

## 2019-05-20 RX ADMIN — SODIUM CHLORIDE, SODIUM LACTATE, POTASSIUM CHLORIDE, AND CALCIUM CHLORIDE: .6; .31; .03; .02 INJECTION, SOLUTION INTRAVENOUS at 08:05

## 2019-05-20 NOTE — TRANSFER OF CARE
"Anesthesia Transfer of Care Note    Patient: Sho Lennon    Procedure(s) Performed: Procedure(s) (LRB):  EXCISION, MASS, LOWER EXTREMITY  thigh (Right)    Patient location: PACU    Anesthesia Type: general    Transport from OR: Transported from OR on 2-3 L/min O2 by NC with adequate spontaneous ventilation    Post pain: adequate analgesia    Post assessment: no apparent anesthetic complications and tolerated procedure well    Post vital signs: stable    Level of consciousness: awake, alert and oriented    Nausea/Vomiting: no nausea/vomiting    Complications: none    Transfer of care protocol was followed      Last vitals:   Visit Vitals  /61   Pulse 88   Temp 37.2 °C (99 °F) (Skin)   Resp 16   Ht 4' 11" (1.499 m)   Wt 54.4 kg (120 lb)   SpO2 100%   Breastfeeding? No   BMI 24.24 kg/m²     "

## 2019-05-20 NOTE — ANESTHESIA POSTPROCEDURE EVALUATION
Anesthesia Post Evaluation    Patient: Sho Lennon    Procedure(s) Performed: Procedure(s) (LRB):  EXCISION, MASS, LOWER EXTREMITY  thigh (Right)    Final Anesthesia Type: general  Patient location during evaluation: PACU  Patient participation: Yes- Able to Participate  Level of consciousness: awake and alert and oriented  Post-procedure vital signs: reviewed and stable  Pain management: adequate  Airway patency: patent  PONV status at discharge: No PONV  Anesthetic complications: no      Cardiovascular status: blood pressure returned to baseline and stable  Respiratory status: unassisted and spontaneous ventilation  Hydration status: euvolemic  Follow-up not needed.          Vitals Value Taken Time   /79 5/20/2019 10:34 AM   Temp 37 °C (98.6 °F) 5/20/2019  9:56 AM   Pulse 76 5/20/2019 10:34 AM   Resp 16 5/20/2019 10:34 AM   SpO2 100 % 5/20/2019  9:56 AM         Event Time     Out of Recovery 09:55:50          Pain/Dionisio Score: Pain Rating Prior to Med Admin: 2 (5/20/2019  9:41 AM)  Dionisio Score: 10 (5/20/2019 10:50 AM)

## 2019-05-20 NOTE — PLAN OF CARE
Pt awake, alert.  Vital signs stable, tolerating clear liquids without nausea, denies pain, rt thigh  dressing clean dry and intact. No adverse effects of anesthesia noted. Transferred to post op per stretcher.  Report given to Nely

## 2019-05-20 NOTE — BRIEF OP NOTE
[facility]  Brief Operative Note     SUMMARY     Surgery Date: 5/20/2019     Surgeon(s) and Role:     * Long Menendez MD - Primary    Assisting Surgeon: None    Pre-op Diagnosis:  Subcutaneous mass [R22.9]    Post-op Diagnosis:  Post-Op Diagnosis Codes:     * Subcutaneous mass [R22.9]    Procedure(s) (LRB):  EXCISION, MASS, LOWER EXTREMITY  thigh (Right)    Anesthesia: General    Description of the findings of the procedure: Subcutaneous mass of R leg just anterior to fascia.  3 cm     Findings/Key Components: see above.      Estimated Blood Loss: 5 mL         Specimens:   Specimen (12h ago, onward)    Start     Ordered    05/20/19 0903  Specimen to Pathology - Surgery  Once     Comments:  Pre-op Diagnosis: Subcutaneous mass [R22.9]Post-op Diagnosis: sameProcedure(s):EXCISION, MASS, LOWER EXTREMITY  thigh Number of specimens: 1Name of specimens: 1. Subcutaneous mass right thigh     Start Status     05/20/19 0903 Collected (05/20/19 0905) Order ID: [id number]       05/20/19 0903          Discharge Note    SUMMARY     Admit Date: 5/20/2019    Discharge Date and Time:  05/20/2019 9:11 AM    Hospital Course (synopsis of major diagnoses, care, treatment, and services provided during the course of the hospital stay): Pt presented for removal of subcutaneous mass.  Procedure and post op course were without event and pt was discharged to home.       Final Diagnosis: Post-Op Diagnosis Codes:     * Subcutaneous mass [R22.9]    Disposition: Home or Self Care    Follow Up/Patient Instructions:     Medications:  Reconciled Home Medications:      Medication List      START taking these medications    HYDROcodone-acetaminophen 5-325 mg per tablet  Commonly known as:  NORCO  Take 1 tablet by mouth every 6 (six) hours as needed for Pain.        ASK your doctor about these medications    cetirizine 10 MG tablet  Commonly known as:  ZYRTEC  Take 1 tablet (10 mg total) by mouth once daily.      dexchlorphen-phenylephrine-DM 1-5-10 mg/5 mL Syrp  Commonly known as:  POLYTUSSIN DM  Take 5 mLs by mouth every 4 (four) hours as needed.     fluticasone propionate 50 mcg/actuation nasal spray  Commonly known as:  FLONASE  2 sprays (100 mcg total) by Each Nare route once daily.     * levothyroxine 75 MCG tablet  Commonly known as:  SYNTHROID  Take 75 mcg by mouth once daily.     * levothyroxine 50 MCG tablet  Commonly known as:  SYNTHROID  Take 50 mcg by mouth once daily.     pseudoephedrine 30 MG tablet  Commonly known as:  SUDAFED  Take 60 mg by mouth every 4 (four) hours as needed for Congestion.         * This list has 2 medication(s) that are the same as other medications prescribed for you. Read the directions carefully, and ask your doctor or other care provider to review them with you.              Discharge Procedure Orders   Diet general     Call MD for:  extreme fatigue     Call MD for:  persistent dizziness or light-headedness     Call MD for:  hives     Call MD for:  redness, tenderness, or signs of infection (pain, swelling, redness, odor or green/yellow discharge around incision site)     Call MD for:  difficulty breathing, headache or visual disturbances     Call MD for:  severe uncontrolled pain     Call MD for:  persistent nausea and vomiting     Call MD for:  temperature >100.4     Remove dressing in 48 hours     Activity as tolerated     Follow-up Information     Long Menendez MD In 2 weeks.    Specialties:  General Surgery, Colon and Rectal Surgery, Surgery  Contact information:  [address] [facility] [place] [address]  [phone]

## 2019-05-20 NOTE — OR NURSING
at bedside. Update given. Text notifications declined. Denies needs at this time. Pt states she is comfortable.

## 2019-05-20 NOTE — DISCHARGE INSTRUCTIONS
May shower after dressing is removed, No Baths, Pools, Hot Tubs until after post operative visit.    General Post Operative Instructions:    · Do not drink alcoholic beverages including beer for 24 hours or as long as you are on pain medicine.  · Do not drive a motor vehicle, operate machinery or power tools, or sign legal papers for 24 hours or as long as you are on pain medication.  · You may experience light-headedness, dizziness and sleepiness following surgery.  Please do not stay alone.  A responsible adult should be with you for this 24 hour period.  · Go home and rest.  · Progress slowly to a normal diet unless instructed.  Otherwise, begin with liquids, soup and crackers, advance to solid foods.  · Certain anesthetics and pain medications may produce nausea and vomiting.  If nausea becomes a problem, call your doctor.  · A nurse will be calling you sometime after surgery. Do not be alarmed. This is our way of finding out how you are doing.  · Several times every hour while you are awake, take 2-3 deep breaths and cough.  If you have had abdominal surgery, support your stomach with a pillow firmly. This will prevent pneumonia.  · Several times every hour while you are awake, pump and flex your feet 5-6 times and do foot circles. This will help prevent blood clots.  · Call your doctor for severe pain, bleeding, fever, or signs of infection (pain, swelling, redness, foul odor, drainage).    Discharge Instructions: After Your Surgery/Procedure  Youve just had surgery. During surgery you were given medicine called anesthesia to keep you relaxed and free of pain. After surgery you may have some pain or nausea. This is common. Here are some tips for feeling better and getting well after surgery.     Stay on schedule with your medication.   Going home  Your doctor or nurse will show you how to take care of yourself when you go home. He or she will also answer your questions. Have an adult family member or friend  "drive you home.      For your safety we recommend these precaution for the first 24 hours after your procedure:  · Do not drive or use heavy equipment.  · Do not make important decisions or sign legal papers.  · Do not drink alcohol.  · Have someone stay with you, if needed. He or she can watch for problems and help keep you safe.  · Your concentration, balance, coordination, and judgement may be impaired for many hours after anesthesia.  Use caution when ambulating or standing up.     · You may feel weak and "washed out" after anesthesia and surgery.      Subtle residual effects of general anesthesia or sedation with regional / local anesthesia can last more than 24 hours.  Rest for the remainder of the day or longer if your Doctor/Surgeon has advised you to do so.  Although you may feel normal within the first 24 hours, your reflexes and mental ability may be impaired without you realizing it.  You may feel dizzy, lightheaded or sleepy for 24 hours or longer.      Be sure to go to all follow-up visits with your doctor. And rest after your surgery for as long as your doctor tells you to.  Coping with pain  If you have pain after surgery, pain medicine will help you feel better. Take it as told, before pain becomes severe. Also, ask your doctor or pharmacist about other ways to control pain. This might be with heat, ice, or relaxation. And follow any other instructions your surgeon or nurse gives you.  Tips for taking pain medicine  To get the best relief possible, remember these points:  · Pain medicines can upset your stomach. Taking them with a little food may help.  · Most pain relievers taken by mouth need at least 20 to 30 minutes to start to work.  · Taking medicine on a schedule can help you remember to take it. Try to time your medicine so that you can take it before starting an activity. This might be before you get dressed, go for a walk, or sit down for dinner.  · Constipation is a common side effect of " pain medicines. Call your doctor before taking any medicines such as laxatives or stool softeners to help ease constipation. Also ask if you should skip any foods. Drinking lots of fluids and eating foods such as fruits and vegetables that are high in fiber can also help. Remember, do not take laxatives unless your surgeon has prescribed them.  · Drinking alcohol and taking pain medicine can cause dizziness and slow your breathing. It can even be deadly. Do not drink alcohol while taking pain medicine.  · Pain medicine can make you react more slowly to things. Do not drive or run machinery while taking pain medicine.  Your health care provider may tell you to take acetaminophen to help ease your pain. Ask him or her how much you are supposed to take each day. Acetaminophen or other pain relievers may interact with your prescription medicines or other over-the-counter (OTC) drugs. Some prescription medicines have acetaminophen and other ingredients. Using both prescription and OTC acetaminophen for pain can cause you to overdose. Read the labels on your OTC medicines with care. This will help you to clearly know the list of ingredients, how much to take, and any warnings. It may also help you not take too much acetaminophen. If you have questions or do not understand the information, ask your pharmacist or health care provider to explain it to you before you take the OTC medicine.  Managing nausea  Some people have an upset stomach after surgery. This is often because of anesthesia, pain, or pain medicine, or the stress of surgery. These tips will help you handle nausea and eat healthy foods as you get better. If you were on a special food plan before surgery, ask your doctor if you should follow it while you get better. These tips may help:  · Do not push yourself to eat. Your body will tell you when to eat and how much.  · Start off with clear liquids and soup. They are easier to digest.  · Next try semi-solid  foods, such as mashed potatoes, applesauce, and gelatin, as you feel ready.  · Slowly move to solid foods. Dont eat fatty, rich, or spicy foods at first.  · Do not force yourself to have 3 large meals a day. Instead eat smaller amounts more often.  · Take pain medicines with a small amount of solid food, such as crackers or toast, to avoid nausea.     Call your surgeon if  · You still have pain an hour after taking medicine. The medicine may not be strong enough.  · You feel too sleepy, dizzy, or groggy. The medicine may be too strong.  · You have side effects like nausea, vomiting, or skin changes, such as rash, itching, or hives.       If you have obstructive sleep apnea  You were given anesthesia medicine during surgery to keep you comfortable and free of pain. After surgery, you may have more apnea spells because of this medicine and other medicines you were given. The spells may last longer than usual.   At home:  · Keep using the continuous positive airway pressure (CPAP) device when you sleep. Unless your health care provider tells you not to, use it when you sleep, day or night. CPAP is a common device used to treat obstructive sleep apnea.  · Talk with your provider before taking any pain medicine, muscle relaxants, or sedatives. Your provider will tell you about the possible dangers of taking these medicines.  © 2778-3370 [employer]. [address] [place] [address]. All rights reserved. This information is not intended as a substitute for professional medical care. Always follow your healthcare professional's instructions.      Using Opioids for Pain Management     Your doctor has given instructions for you to take an opioid.  This is a drug for bad pain.  It helps control pain without causing bleeding and kidney problems.  Common opioid names are morphine, hydromorphone, oxycodone, and methadone. These drugs are called narcotics.    There are several safety concerns you need to  know.     · It is against the law to give or sell this drug to another person.  You must keep this medicine safely locked.    · You may have side effects from taking this medication.  These include nausea, itching, sweating, sleepiness, a change in your ability to breathe, and depression.  · Do not take alcohol or sleeping pills opioids.    · Long-term opoid use may no longer giver you relief from pain.  It can cause you stomach pain, mental anxiety, and headaches.  Long-term opoid use can potentially lead to unlawful street drug abuse and reduce your ability to stay employed.    · Your body may become opioid tolerant if you need to take more to get relief.    · You must stop taking opioids if you begin having more pain as a result of the medicine.    · Opioid withdrawal occurs when you have to stop taking the drug.  It can cause you to have nausea, vomiting, diarrhea, stomach pain, anxiety, and dilated pupils in your eyes. This condition means you are opioid dependent.    · Addiction is a drug induced brain disease. It means there are changes in how your brain is working.  Children, teens, and young adults under 25 years old are more likely to get addicted to opioids.      · Addiction can happen with repeated opioid use.  It does not happen with short-term use of two weeks or less.       For more information, please speak with your doctor or pharmacist.      Post op instructions for prevention of DVT  What is deep vein thrombosis?  Deep vein thrombosis (DVT) is the medical term for blood clots in the deep veins of the leg.  These blood clots can be dangerous.  A DVT can block a blood vessel and keep blood from getting where it needs to go.  Another problem is that the clot can travel to other parts of the body such as the lungs.  A clot that travels to the lungs is called a pulmonary embolus (PE) and can cause serious problems with breathing which can lead to death.  Am I at risk for DVT/PE?  If you are not very  active, you are at risk of DVT.  Anyone confined to bed, sitting for long periods of time, recovering from surgery, etc. increases the risk of DVT.  Other risk factors are cancer diagnosis, certain medications, estrogen replacement in any form,older age, obesity, pregnancy, smoking, history of clotting disorders, and dehydration.  How will I know if I have a DVT?   Swelling in the lower leg   Pain   Warmth, redness, hardness or bulging of the vein  If you have any of these symptoms, call your doctors office right away.  Some people will not have any symptoms until the clot moves to the lungs.  What are the symptoms of a PE?   Panting, shortness of breath, or trouble breathing   Sharp, knife-like chest pain when you breathe   Coughing or coughing up blood   Rapid heartbeat  If you have any of these symptoms or get worse quickly, call 911 for emergency treatment.  How can I prevent a DVT?   Avoid long periods of inactivity and dont cross your legs--get up and walk around every hour or so.   Stay active--walking after surgery is highly encouraged.  This means you should get out of the house and walk in the neighborhood.  Going up and down stairs will not impair healing (unless advised against such activity by your doctor).     Drink plenty of noncaffeinated, nonalcoholic fluids each day to prevent dehydration.   Wear special support stockings, if they have been advised by your doctor.   If you travel, stop at least once an hour and walk around.   Avoid smoking (assistance with stopping is available through your healthcare provider)  Always notify your doctor if you are not able to follow the post operative instructions that are given to you at the time of discharge.  It may be necessary to prescribe one of the medications available to prevent DVT.    We hope your stay was comfortable as you heal now, mend and rest.    For we have enjoyed taking care of you by giving your our best.    And as you get  better, by regaining your health and strength;   We count it as a privilege to have served you and hope your time at [facility] was well spent.      Thank  You!!!

## 2019-05-20 NOTE — OP NOTE
DATE OF PROCEDURE:  05/20/2019    STAFF SURGEON:  Long Menendez M.D.    PREOPERATIVE DIAGNOSIS:  Subcutaneous mass, right leg.    POSTOPERATIVE DIAGNOSIS:  Subcutaneous mass, right leg.    PROCEDURE:  Excision of subcutaneous mass, right leg.    ANESTHESIA:  General via LMA.    INDICATION FOR PROCEDURE:  A pleasant 79-year-old female presented to my office   with complaints of a mass to her right.  It is significantly large giving her   pain and discomfort.  She desired to have it surgically removed and was   scheduled for surgery on the above-mentioned date.    DESCRIPTION OF PROCEDURE:  Following signing of informed consent, she received   preoperative IV antibiotics, taken to the OR and placed in supine position.    SCDs were placed.  General anesthesia via an LMA was administered.  Abdomen was   prepped and draped in standard fashion and an appropriate timeout procedure was   then performed.  Having performed timeout procedure, I began the procedure by   making a small vertical incision just over the palpable lesion in her right   thigh, carried down to deep dermal tissue over the palpable lesion, dissected   around a moderate-sized subcutaneous mass consisting of lipoma, excised in its   entirety.  The lesion measured approximately 3 cm.  There were no immediate   complications.  I irrigated and ensured adequate hemostasis.  Having ensured   adequate hemostasis, I closed the wound in 2 layers with 3-0 Vicryl and 4-0   Monocryl.  The patient was then extubated, taken to the recovery room in stable   condition.  There were no immediate complications.      RHIANNON  dd: 05/20/2019 09:14:22 (CDT)  td: 05/20/2019 11:54:42 (CDT)  Doc ID   #[id number]  Job ID #[id number]    CC:

## 2019-05-20 NOTE — INTERVAL H&P NOTE
The patient has been examined and the H&P has been reviewed:    I concur with the findings and no changes have occurred since H&P was written.    Anesthesia/Surgery risks, benefits and alternative options discussed and understood by patient/family.          Active Hospital Problems    Diagnosis  POA    Subcutaneous mass [R22.9]  Yes      Resolved Hospital Problems   No resolved problems to display.

## 2019-05-20 NOTE — PLAN OF CARE
Discharge instructions givne to the pt and her   All questions answered and concerns addressed  Pt and her  were able to teach back discharge instructions    VSS    Pt denies pain and nausea

## 2019-05-20 NOTE — ANESTHESIA PREPROCEDURE EVALUATION
05/20/2019  Sho Lennon is a 79 y.o., female.    Anesthesia Evaluation    I have reviewed the Patient Summary Reports.    I have reviewed the Nursing Notes.   I have reviewed the Medications.     Review of Systems  Anesthesia Hx:  No problems with previous Anesthesia    Social:  Non-Smoker    Cardiovascular:  Cardiovascular Normal     Pulmonary:  Pulmonary Normal    Renal/:   Chronic Renal Disease renal calculi    Neurological:  Neurology Normal    Endocrine:   Thyroid nodule   Psych:   anxiety          Physical Exam  General:  Well nourished    Airway/Jaw/Neck:  Airway Findings: Mouth Opening: Normal Tongue: Normal  General Airway Assessment: Adult  Oropharynx Findings:  Mallampati: II  Jaw/Neck Findings:  Neck ROM: Normal ROM     Eyes/Ears/Nose:  Eyes/Ears/Nose Findings:    Dental:  Dental Findings:   Chest/Lungs:  Chest/Lungs Findings: Normal Respiratory Rate     Heart/Vascular:  Heart Findings: Rate: Normal  Rhythm: Regular Rhythm        Mental Status:  Mental Status Findings:  Cooperative, Alert and Oriented         Anesthesia Plan  Type of Anesthesia, risks & benefits discussed:  Anesthesia Type:  general  Patient's Preference:   Intra-op Monitoring Plan: standard ASA monitors  Intra-op Monitoring Plan Comments:   Post Op Pain Control Plan: multimodal analgesia  Post Op Pain Control Plan Comments:   Induction:   IV  Beta Blocker:  Patient is not currently on a Beta-Blocker (No further documentation required).       Informed Consent: Patient understands risks and agrees with Anesthesia plan.  Questions answered. Anesthesia consent signed with patient.  ASA Score: 2     Day of Surgery Review of History & Physical:  There are no significant changes.   H&P completed by Anesthesiologist.       Ready For Surgery From Anesthesia Perspective.

## 2019-05-22 ENCOUNTER — TELEPHONE (OUTPATIENT)
Dept: SURGERY | Facility: CLINIC | Age: 79
End: 2019-05-22

## 2019-05-22 NOTE — TELEPHONE ENCOUNTER
----- Message from Zuleyma Fish sent at 5/22/2019  3:21 PM CDT -----  Contact: self  Patient [phone] had surgery on Monday 05 20 19/had a mass removed from her leg/she is saying that she was not advised of how to care for the wound/please call patient to discuss

## 2019-05-22 NOTE — TELEPHONE ENCOUNTER
Spoke to pt, ok to remove dressing leave steri strips in place until they fall off on their own.She can reapply a cover dressing if it makes her feel comfortable. Ok to shower no tub soaks, call prn any question or concern.

## 2019-05-27 ENCOUNTER — TELEPHONE (OUTPATIENT)
Dept: FAMILY MEDICINE | Facility: CLINIC | Age: 79
End: 2019-05-27

## 2019-05-27 ENCOUNTER — CLINICAL SUPPORT (OUTPATIENT)
Dept: FAMILY MEDICINE | Facility: CLINIC | Age: 79
End: 2019-05-27
Payer: MEDICARE

## 2019-05-27 VITALS — DIASTOLIC BLOOD PRESSURE: 48 MMHG | SYSTOLIC BLOOD PRESSURE: 138 MMHG

## 2019-05-27 PROCEDURE — 99211 OFF/OP EST MAY X REQ PHY/QHP: CPT | Mod: PBBFAC,PO

## 2019-05-27 PROCEDURE — 99999 PR PBB SHADOW E&M-EST. PATIENT-LVL I: ICD-10-PCS | Mod: PBBFAC,,,

## 2019-05-27 PROCEDURE — 99999 PR PBB SHADOW E&M-EST. PATIENT-LVL I: CPT | Mod: PBBFAC,,,

## 2019-05-27 RX ORDER — AZITHROMYCIN 250 MG/1
TABLET, FILM COATED ORAL
Refills: 0 | COMMUNITY
Start: 2019-05-16 | End: 2019-06-04

## 2019-05-27 RX ORDER — PROMETHAZINE HYDROCHLORIDE AND PHENYLEPHRINE HYDROCHLORIDE 6.25; 5 MG/5ML; MG/5ML
SYRUP ORAL
Refills: 0 | COMMUNITY
Start: 2019-05-16 | End: 2019-06-04

## 2019-05-27 NOTE — TELEPHONE ENCOUNTER
Last BP was 152/78 4/29/19. Last dose of BP medication was never, not taking bp meds. At home readings are not being done. BP today 138/48.      Patient has been sick with a cough and sore throat. Discontinued the Polytussin cough syrup and got Z-pack and promethazine VC from a different provider and states she is feeling better.

## 2019-06-04 ENCOUNTER — OFFICE VISIT (OUTPATIENT)
Dept: SURGERY | Facility: CLINIC | Age: 79
End: 2019-06-04
Payer: MEDICARE

## 2019-06-04 VITALS — BODY MASS INDEX: 23.59 KG/M2 | RESPIRATION RATE: 16 BRPM | HEIGHT: 59 IN | WEIGHT: 117 LBS | TEMPERATURE: 98 F

## 2019-06-04 DIAGNOSIS — Z09 POSTOP CHECK: Primary | ICD-10-CM

## 2019-06-04 PROCEDURE — 99213 OFFICE O/P EST LOW 20 MIN: CPT | Mod: PBBFAC,PO | Performed by: SURGERY

## 2019-06-04 PROCEDURE — 99024 PR POST-OP FOLLOW-UP VISIT: ICD-10-PCS | Mod: POP,,, | Performed by: SURGERY

## 2019-06-04 PROCEDURE — 99999 PR PBB SHADOW E&M-EST. PATIENT-LVL III: CPT | Mod: PBBFAC,,, | Performed by: SURGERY

## 2019-06-04 PROCEDURE — 99999 PR PBB SHADOW E&M-EST. PATIENT-LVL III: ICD-10-PCS | Mod: PBBFAC,,, | Performed by: SURGERY

## 2019-06-04 PROCEDURE — 99024 POSTOP FOLLOW-UP VISIT: CPT | Mod: POP,,, | Performed by: SURGERY

## 2019-06-04 NOTE — PROGRESS NOTES
Cc: post op    HPI: 79 y.o.  female  2 weeks s/p removal of lipoma from R leg.   Pt notes that she is feeling well.  No pain.  No complaints    PE: AFVSS    AAOx3  CTA  Soft/NT/nd  Inc: c/d/i        Path: lipoma    A/P:   Pt doing well post surgery.   F/U with me prn

## 2019-08-16 ENCOUNTER — PATIENT OUTREACH (OUTPATIENT)
Dept: ADMINISTRATIVE | Facility: HOSPITAL | Age: 79
End: 2019-08-16

## 2019-08-22 ENCOUNTER — TELEPHONE (OUTPATIENT)
Dept: FAMILY MEDICINE | Facility: CLINIC | Age: 79
End: 2019-08-22

## 2019-08-22 NOTE — TELEPHONE ENCOUNTER
Returned call and spoke to patient regarding test results. Patient verbalize and understand results given. Letter printed and mail to patient on 8/20/19 by Dr. Lewis nurse Joanne Martinez LPN

## 2019-08-22 NOTE — TELEPHONE ENCOUNTER
----- Message from Connor Morales sent at 8/22/2019  1:08 PM CDT -----  Contact: self  Pt called speak with nurse she stated she missed call.          Pt callback number [phone]

## 2019-08-29 ENCOUNTER — OFFICE VISIT (OUTPATIENT)
Dept: FAMILY MEDICINE | Facility: CLINIC | Age: 79
End: 2019-08-29
Payer: MEDICARE

## 2019-08-29 VITALS
SYSTOLIC BLOOD PRESSURE: 170 MMHG | RESPIRATION RATE: 12 BRPM | TEMPERATURE: 98 F | BODY MASS INDEX: 24.31 KG/M2 | OXYGEN SATURATION: 98 % | WEIGHT: 120.56 LBS | DIASTOLIC BLOOD PRESSURE: 70 MMHG | HEIGHT: 59 IN | HEART RATE: 75 BPM

## 2019-08-29 DIAGNOSIS — E03.9 HYPOTHYROIDISM, UNSPECIFIED TYPE: ICD-10-CM

## 2019-08-29 DIAGNOSIS — M85.80 OSTEOPENIA, UNSPECIFIED LOCATION: ICD-10-CM

## 2019-08-29 DIAGNOSIS — E04.1 THYROID NODULE: Primary | ICD-10-CM

## 2019-08-29 DIAGNOSIS — I10 ESSENTIAL HYPERTENSION: ICD-10-CM

## 2019-08-29 DIAGNOSIS — J30.1 SEASONAL ALLERGIC RHINITIS DUE TO POLLEN: ICD-10-CM

## 2019-08-29 DIAGNOSIS — R03.0 ELEVATED BP WITHOUT DIAGNOSIS OF HYPERTENSION: ICD-10-CM

## 2019-08-29 DIAGNOSIS — Z23 NEED FOR VACCINATION WITH 13-POLYVALENT PNEUMOCOCCAL CONJUGATE VACCINE: ICD-10-CM

## 2019-08-29 DIAGNOSIS — R73.03 PRE-DIABETES: ICD-10-CM

## 2019-08-29 PROCEDURE — 99214 OFFICE O/P EST MOD 30 MIN: CPT | Mod: S$PBB,,, | Performed by: FAMILY MEDICINE

## 2019-08-29 PROCEDURE — 99999 PR PBB SHADOW E&M-EST. PATIENT-LVL IV: ICD-10-PCS | Mod: PBBFAC,,, | Performed by: FAMILY MEDICINE

## 2019-08-29 PROCEDURE — 99214 OFFICE O/P EST MOD 30 MIN: CPT | Mod: PBBFAC,PO,25 | Performed by: FAMILY MEDICINE

## 2019-08-29 PROCEDURE — 99999 PR PBB SHADOW E&M-EST. PATIENT-LVL IV: CPT | Mod: PBBFAC,,, | Performed by: FAMILY MEDICINE

## 2019-08-29 PROCEDURE — 99214 PR OFFICE/OUTPT VISIT, EST, LEVL IV, 30-39 MIN: ICD-10-PCS | Mod: S$PBB,,, | Performed by: FAMILY MEDICINE

## 2019-08-29 PROCEDURE — G0009 ADMIN PNEUMOCOCCAL VACCINE: HCPCS | Mod: PBBFAC,PO

## 2019-08-29 RX ORDER — LEVOTHYROXINE SODIUM 75 UG/1
TABLET ORAL
COMMUNITY
Start: 2019-08-29 | End: 2023-01-01 | Stop reason: SDUPTHER

## 2019-08-29 RX ORDER — LOSARTAN POTASSIUM AND HYDROCHLOROTHIAZIDE 12.5; 5 MG/1; MG/1
1 TABLET ORAL DAILY
Qty: 90 TABLET | Refills: 3 | Status: SHIPPED | OUTPATIENT
Start: 2019-08-29 | End: 2023-01-01 | Stop reason: SDUPTHER

## 2019-08-29 RX ORDER — LEVOTHYROXINE SODIUM 50 UG/1
TABLET ORAL
COMMUNITY
Start: 2019-08-29 | End: 2023-01-01 | Stop reason: SDUPTHER

## 2019-08-29 NOTE — PATIENT INSTRUCTIONS
Established High Blood Pressure    High blood pressure (hypertension) is a chronic disease. Often, healthcare providers dont know what causes it. But it can be caused by certain health conditions and medicines.  If you have high blood pressure, you may not have any symptoms. If you do have symptoms, they may include headache, dizziness, changes in your vision, chest pain, and shortness of breath. But even without symptoms, high blood pressure thats not treated raises your risk for heart attack and stroke. High blood pressure is a serious health risk and shouldnt be ignored.  A blood pressure reading is made up of two numbers: a higher number over a lower number. The top number is the systolic pressure. The bottom number is the diastolic pressure. A normal blood pressure is a systolic pressure of  less than 120 over a diastolic pressure of less than 80. You will see your blood pressure readings written together. For example, a person with a systolic pressure of 188 and a diastolic pressure of 78 will have 118/78 written in the medical record.  High blood pressure is when either the top number is 140 or higher, or the bottom number is 90 or higher. This must be the result when taking your blood pressure a number of times. The blood pressures between normal and high are called prehypertension.  Home care  If you have high blood pressure, you should do what is listed below to lower your blood pressure. If you are taking medicines for high blood pressure, these methods may reduce or end your need for medicines in the future.  · Begin a weight-loss program if you are overweight.  · Cut back on how much salt you get in your diet. Heres how to do this:  ¨ Dont eat foods that have a lot of salt. These include olives, pickles, smoked meats, and salted potato chips.  ¨ Dont add salt to your food at the table.  ¨ Use only small amounts of salt when cooking.  · Start an exercise program. Talk with your healthcare  provider about the type of exercise program that would be best for you. It doesn't have to be hard. Even brisk walking for 20 minutes 3 times a week is a good form of exercise.  · Dont take medicines that stimulate the heart. This includes many over-the-counter cold and sinus decongestant pills and sprays, as well as diet pills. Check the warnings about hypertension on the label. Before buying any over-the-counter medicines or supplements, always ask the pharmacist about the product's potential interaction with your high blood pressure and your high blood pressure medicines.  · Stimulants such as amphetamine or cocaine could be deadly for someone with high blood pressure. Never take these.  · Limit how much caffeine you get in your diet. Switch to caffeine-free products.  · Stop smoking. If you are a long-time smoker, this can be hard. Talk to your healthcare provider about medicines and nicotine replacement options to help you. Also, enroll in a stop-smoking program to make it more likely that you will quit for good.  · Learn how to handle stress. This is an important part of any program to lower blood pressure. Learn about relaxation methods like meditation, yoga, or biofeedback.  · If your provider prescribed medicines, take them exactly as directed. Missing doses may cause your blood pressure get out of control.  · If you miss a dose or doses, check with your healthcare provider or pharmacist about what to do.  · Consider buying an automatic blood pressure machine. Ask your provider for a recommendation. You can get one of these at most pharmacies.     The American Heart Association recommends the following guidelines for home blood pressure monitoring:  · Don't smoke or drink coffee for 30 minutes before taking your blood pressure.  · Go to the bathroom before the test.  · Relax for 5 minutes before taking the measurement.  · Sit with your back supported (don't sit on a couch or soft chair); keep your feet on  the floor uncrossed. Place your arm on a solid flat surface (like a table) with the upper part of the arm at heart level. Place the middle of the cuff directly above the eye of the elbow. Check the monitor's instruction manual for an illustration.  · Take multiple readings. When you measure, take 2 to 3 readings one minute apart and record all of the results.  · Take your blood pressure at the same time every day, or as your healthcare provider recommends.  · Record the date, time, and blood pressure reading.  · Take the record with you to your next medical appointment. If your blood pressure monitor has a built-in memory, simply take the monitor with you to your next appointment.  · Call your provider if you have several high readings. Don't be frightened by a single high blood pressure reading, but if you get several high readings, check in with your healthcare provider.  · Note: When blood pressure reaches a systolic (top number) of 180 or higher OR diastolic (bottom number) of 110 or higher, seek emergency medical treatment.  Follow-up care  You will need to see your healthcare provider regularly. This is to check your blood pressure and to make changes to your medicines. Make a follow-up appointment as directed. Bring the record of your home blood pressure readings to the appointment.  When to seek medical advice  Call your healthcare provider right away if any of these occur:  · Blood pressure reaches a systolic (upper number) of 180 or higher OR a diastolic (bottom number) of 110 or higher  · Chest pain or shortness of breath  · Severe headache  · Throbbing or rushing sound in the ears  · Nosebleed  · Sudden severe pain in your belly (abdomen)  · Extreme drowsiness, confusion, or fainting  · Dizziness or spinning sensation (vertigo)  · Weakness of an arm or leg or one side of the face  · You have problems speaking or seeing   Date Last Reviewed: 12/1/2016  © 9105-2093 [employer]. [address] [place] [address]  [place] [address]. All rights reserved. This information is not intended as a substitute for professional medical care. Always follow your healthcare professional's instructions.

## 2019-08-29 NOTE — PROGRESS NOTES
Subjective:       Patient ID: Sho Lennon is a 79 y.o. female.    Chief Complaint: Establish Care    HPI  Review of Systems   Constitutional: Positive for fatigue. Negative for chills and fever.   HENT: Positive for congestion, postnasal drip, rhinorrhea, sneezing and sore throat. Negative for ear discharge, ear pain and sinus pressure.    Respiratory: Positive for cough. Negative for shortness of breath and wheezing.        Patient Active Problem List   Diagnosis    Thyroid nodule    Pre-diabetes    Anxiety    Subcutaneous mass    Hypothyroidism    Osteopenia    Essential hypertension    Need for vaccination with 13-polyvalent pneumococcal conjugate vaccine     Patient is here to establish care.    Endocrine Dr. Parmar follows hypothyroidism and thyroid nodule.  Dexa 2019 -osteopenia .  Took actonel inconsistently and so stopped 2019.  Alternates 50 and 75     Had right thigh lesion removed Dr. Menendez 5/19 -path showed sub cutaneous fat possible lipoma-correlate clinically    C/o mild tickle in throat today.  Has allergies and occ takes sudafed but not today.  Taking IB at night for backache  Objective:      Physical Exam   Constitutional: She appears well-developed and well-nourished.   HENT:   Right Ear: Tympanic membrane and ear canal normal.   Left Ear: Tympanic membrane and ear canal normal.   Nose: Mucosal edema and rhinorrhea present. Right sinus exhibits no maxillary sinus tenderness and no frontal sinus tenderness. Left sinus exhibits no maxillary sinus tenderness and no frontal sinus tenderness.   Mouth/Throat: Posterior oropharyngeal erythema present. No oropharyngeal exudate, posterior oropharyngeal edema or tonsillar abscesses.   Cardiovascular: Normal rate, regular rhythm and normal heart sounds.   Pulmonary/Chest: Effort normal and breath sounds normal.   Skin: Skin is warm and dry.   Psychiatric: She has a normal mood and affect.   Nursing note and vitals reviewed.      Assessment:        1. Thyroid nodule    2. Pre-diabetes    3. Hypothyroidism, unspecified type    4. Elevated BP without diagnosis of hypertension    5. Osteopenia, unspecified location    6. Need for vaccination with 13-polyvalent pneumococcal conjugate vaccine    7. Essential hypertension    8. Seasonal allergic rhinitis due to pollen        Plan:       1. Thyroid nodule  Cont endocrine monitoring    2. Pre-diabetes  Cont monitoring.  Your blood sugar is borderline high.  This means you are at risk for developing type 2 diabetes mellitus.  To lessen your risk you should exercise regularly, avoid excess carbohydrates and work toward a body mass index of less than 25.        3. Hypothyroidism, unspecified type  Cont endocrine mgmt  - levothyroxine (SYNTHROID) 75 MCG tablet; Take 1 po qd on M, W , F  - levothyroxine (SYNTHROID) 50 MCG tablet; Take 1 po qd on Su, Tu, Th, Sa    4. Elevated BP without diagnosis of hypertension  Now a pattern -new dx HTN    5. Osteopenia, unspecified location  Cont monitoring. I recommend regular exercise specifically to add bone mass such as light weight lifting for the upper body and anti-gravity exercises like walking for the lower body and spine.  I also recommend over-the-counter calcium 500 mg + vitamin D 200 units supplements-once daily if you get 2-3 servings of dairy per day in your diet or twice daily if not.  Take them with food.        .      6. Need for vaccination with 13-polyvalent pneumococcal conjugate vaccine  Immunize today.  Counseled patient on risks, benefits and side effects.  Patient elected to proceed with vaccination.    - (In Office Administered) Pneumococcal Conjugate Vaccine (13 Valent) (IM)    7. Essential hypertension  Add  - losartan-hydrochlorothiazide 50-12.5 mg (HYZAAR) 50-12.5 mg per tablet; Take 1 tablet by mouth once daily.  Dispense: 90 tablet; Refill: 3    8. Seasonal allergic rhinitis due to pollen  Recommend otc non-sedating antihistamine such as Loratadine  and/or steroid nasal spray such as Flonase as directed and as needed.  Please return to clinic if symptoms persist after these interventions.  Avoid decongestants        Time spent with patient: 20 minutes    Patient with be reevaluated in 6 months or sooner prn. 4 weeks nurse visit    Greater than 50% of this visit was spent counseling as described in above documentation:Yes

## 2020-03-13 ENCOUNTER — PATIENT OUTREACH (OUTPATIENT)
Dept: ADMINISTRATIVE | Facility: HOSPITAL | Age: 80
End: 2020-03-13

## 2020-03-13 NOTE — PROGRESS NOTES
Chart review completed 03/13/2020.  Care Everywhere updates requested and reviewed.  Immunizations reconciled. Media reviewed.

## 2020-07-17 DIAGNOSIS — Z71.89 COMPLEX CARE COORDINATION: ICD-10-CM

## 2020-09-22 ENCOUNTER — CLINICAL SUPPORT (OUTPATIENT)
Dept: FAMILY MEDICINE | Facility: CLINIC | Age: 80
End: 2020-09-22
Payer: MEDICARE

## 2020-09-22 PROCEDURE — G0008 ADMIN INFLUENZA VIRUS VAC: HCPCS | Mod: S$GLB,,, | Performed by: FAMILY MEDICINE

## 2020-09-22 PROCEDURE — 90694 VACC AIIV4 NO PRSRV 0.5ML IM: CPT | Mod: S$GLB,,, | Performed by: FAMILY MEDICINE

## 2020-09-22 PROCEDURE — 90694 FLU VACCINE - QUADRIVALENT - ADJUVANTED: ICD-10-PCS | Mod: S$GLB,,, | Performed by: FAMILY MEDICINE

## 2020-09-22 PROCEDURE — G0008 PR ADMIN INFLUENZA VIRUS VAC: ICD-10-PCS | Mod: S$GLB,,, | Performed by: FAMILY MEDICINE

## 2020-10-22 DIAGNOSIS — Z12.31 ENCOUNTER FOR SCREENING MAMMOGRAM FOR MALIGNANT NEOPLASM OF BREAST: Primary | ICD-10-CM

## 2020-11-19 ENCOUNTER — HOSPITAL ENCOUNTER (OUTPATIENT)
Dept: RADIOLOGY | Facility: HOSPITAL | Age: 80
Discharge: HOME | End: 2020-11-19
Attending: SPECIALIST
Payer: MEDICARE

## 2020-11-19 DIAGNOSIS — Z12.31 ENCOUNTER FOR SCREENING MAMMOGRAM FOR MALIGNANT NEOPLASM OF BREAST: ICD-10-CM

## 2020-11-19 PROCEDURE — 77067 SCR MAMMO BI INCL CAD: CPT | Mod: TC,PO

## 2020-12-09 DIAGNOSIS — M81.0 AGE-RELATED OSTEOPOROSIS WITHOUT CURRENT PATHOLOGICAL FRACTURE: Primary | ICD-10-CM

## 2021-06-15 ENCOUNTER — HOSPITAL ENCOUNTER (OUTPATIENT)
Dept: RADIOLOGY | Facility: HOSPITAL | Age: 81
Discharge: HOME | End: 2021-06-15
Attending: INTERNAL MEDICINE
Payer: MEDICARE

## 2021-06-15 DIAGNOSIS — M81.0 AGE-RELATED OSTEOPOROSIS WITHOUT CURRENT PATHOLOGICAL FRACTURE: ICD-10-CM

## 2021-06-30 ENCOUNTER — HOSPITAL ENCOUNTER (OUTPATIENT)
Dept: RADIOLOGY | Facility: HOSPITAL | Age: 81
Discharge: HOME | End: 2021-06-30
Attending: INTERNAL MEDICINE
Payer: MEDICARE

## 2021-06-30 PROCEDURE — 77080 DXA BONE DENSITY AXIAL: CPT | Mod: TC,PO

## 2023-01-01 ENCOUNTER — HOSPITAL ENCOUNTER (EMERGENCY)
Facility: HOSPITAL | Age: 83
Discharge: ANOTHER HOSPITAL | End: 2023-08-01
Attending: EMERGENCY MEDICINE
Payer: MEDICARE

## 2023-01-01 ENCOUNTER — OFFICE VISIT (OUTPATIENT)
Dept: FAMILY MEDICINE | Facility: CLINIC | Age: 83
End: 2023-01-01
Payer: MEDICARE

## 2023-01-01 ENCOUNTER — LAB VISIT (OUTPATIENT)
Dept: LAB | Facility: HOSPITAL | Age: 83
End: 2023-01-01
Attending: FAMILY MEDICINE
Payer: MEDICARE

## 2023-01-01 ENCOUNTER — HOSPITAL ENCOUNTER (INPATIENT)
Facility: HOSPITAL | Age: 83
LOS: 1 days | DRG: 023 | End: 2023-08-02
Attending: EMERGENCY MEDICINE | Admitting: PSYCHIATRY & NEUROLOGY
Payer: MEDICARE

## 2023-01-01 ENCOUNTER — HOSPITAL ENCOUNTER (OUTPATIENT)
Dept: RADIOLOGY | Facility: HOSPITAL | Age: 83
Discharge: HOME | End: 2023-05-18
Attending: FAMILY MEDICINE
Payer: MEDICARE

## 2023-01-01 ENCOUNTER — ANESTHESIA (OUTPATIENT)
Dept: SURGERY | Facility: HOSPITAL | Age: 83
DRG: 023 | End: 2023-01-01
Payer: MEDICARE

## 2023-01-01 ENCOUNTER — ANESTHESIA EVENT (OUTPATIENT)
Dept: SURGERY | Facility: HOSPITAL | Age: 83
DRG: 023 | End: 2023-01-01
Payer: MEDICARE

## 2023-01-01 VITALS
TEMPERATURE: 100 F | OXYGEN SATURATION: 98 % | BODY MASS INDEX: 22.23 KG/M2 | SYSTOLIC BLOOD PRESSURE: 106 MMHG | RESPIRATION RATE: 14 BRPM | DIASTOLIC BLOOD PRESSURE: 55 MMHG | WEIGHT: 110.25 LBS | HEART RATE: 83 BPM | HEIGHT: 59 IN

## 2023-01-01 VITALS
OXYGEN SATURATION: 96 % | HEIGHT: 59 IN | BODY MASS INDEX: 22.14 KG/M2 | SYSTOLIC BLOOD PRESSURE: 160 MMHG | TEMPERATURE: 98 F | RESPIRATION RATE: 18 BRPM | DIASTOLIC BLOOD PRESSURE: 80 MMHG | HEART RATE: 95 BPM | WEIGHT: 109.81 LBS

## 2023-01-01 VITALS
TEMPERATURE: 93 F | WEIGHT: 109.81 LBS | SYSTOLIC BLOOD PRESSURE: 129 MMHG | OXYGEN SATURATION: 100 % | DIASTOLIC BLOOD PRESSURE: 55 MMHG | HEART RATE: 91 BPM | BODY MASS INDEX: 22.17 KG/M2 | RESPIRATION RATE: 26 BRPM

## 2023-01-01 DIAGNOSIS — I10 ESSENTIAL HYPERTENSION: ICD-10-CM

## 2023-01-01 DIAGNOSIS — S39.011A STRAIN OF ABDOMINAL MUSCLE, INITIAL ENCOUNTER: ICD-10-CM

## 2023-01-01 DIAGNOSIS — R73.03 PRE-DIABETES: ICD-10-CM

## 2023-01-01 DIAGNOSIS — R73.03 PRE-DIABETES: Primary | ICD-10-CM

## 2023-01-01 DIAGNOSIS — E03.9 HYPOTHYROIDISM, UNSPECIFIED TYPE: ICD-10-CM

## 2023-01-01 DIAGNOSIS — I63.9 STROKE: ICD-10-CM

## 2023-01-01 DIAGNOSIS — I60.9 SUBARACHNOID HEMORRHAGE: ICD-10-CM

## 2023-01-01 DIAGNOSIS — I61.9 INTRAPARENCHYMAL HEMORRHAGE OF BRAIN: Primary | ICD-10-CM

## 2023-01-01 DIAGNOSIS — R41.82 ALTERED MENTAL STATUS, UNSPECIFIED ALTERED MENTAL STATUS TYPE: ICD-10-CM

## 2023-01-01 DIAGNOSIS — E04.1 THYROID NODULE: ICD-10-CM

## 2023-01-01 DIAGNOSIS — R11.10 VOMITING, UNSPECIFIED VOMITING TYPE, UNSPECIFIED WHETHER NAUSEA PRESENT: ICD-10-CM

## 2023-01-01 DIAGNOSIS — R41.82 AMS (ALTERED MENTAL STATUS): ICD-10-CM

## 2023-01-01 DIAGNOSIS — I16.1 HYPERTENSIVE EMERGENCY: ICD-10-CM

## 2023-01-01 DIAGNOSIS — S40.012A CONTUSION OF LEFT SHOULDER, INITIAL ENCOUNTER: ICD-10-CM
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
## 2023-01-01 LAB
ABO + RH BLD: NORMAL
ABO + RH BLD: NORMAL
ALBUMIN SERPL BCP-MCNC: 2.5 G/DL (ref 3.5–5.2)
ALBUMIN SERPL BCP-MCNC: 3.1 G/DL (ref 3.5–5.2)
ALBUMIN SERPL BCP-MCNC: 3.3 G/DL (ref 3.5–5.2)
ALBUMIN SERPL BCP-MCNC: 3.3 G/DL (ref 3.5–5.2)
ALBUMIN SERPL BCP-MCNC: 3.6 G/DL (ref 3.5–5.2)
ALBUMIN SERPL BCP-MCNC: 3.8 G/DL (ref 3.5–5.2)
ALBUMIN SERPL BCP-MCNC: 4.4 G/DL (ref 3.5–5.2)
ALLENS TEST: ABNORMAL
ALP SERPL-CCNC: 50 U/L (ref 55–135)
ALP SERPL-CCNC: 60 U/L (ref 55–135)
ALP SERPL-CCNC: 62 U/L (ref 55–135)
ALP SERPL-CCNC: 63 U/L (ref 55–135)
ALP SERPL-CCNC: 64 U/L (ref 55–135)
ALP SERPL-CCNC: 66 U/L (ref 55–135)
ALP SERPL-CCNC: 67 U/L (ref 55–135)
ALT SERPL W/O P-5'-P-CCNC: 20 U/L (ref 10–44)
ALT SERPL W/O P-5'-P-CCNC: 21 U/L (ref 10–44)
ALT SERPL W/O P-5'-P-CCNC: 23 U/L (ref 10–44)
ALT SERPL W/O P-5'-P-CCNC: 23 U/L (ref 10–44)
ALT SERPL W/O P-5'-P-CCNC: 25 U/L (ref 10–44)
ALT SERPL W/O P-5'-P-CCNC: 28 U/L (ref 10–44)
ALT SERPL W/O P-5'-P-CCNC: 32 U/L (ref 10–44)
AMPHET+METHAMPHET UR QL: NEGATIVE
ANION GAP SERPL CALC-SCNC: 11 MMOL/L (ref 8–16)
ANION GAP SERPL CALC-SCNC: 14 MMOL/L (ref 8–16)
ANION GAP SERPL CALC-SCNC: ABNORMAL MMOL/L (ref 8–16)
ANISOCYTOSIS BLD QL SMEAR: SLIGHT
APTT PPP: 23.8 SEC (ref 21–32)
AST SERPL-CCNC: 24 U/L (ref 10–40)
AST SERPL-CCNC: 44 U/L (ref 10–40)
AST SERPL-CCNC: 45 U/L (ref 10–40)
AST SERPL-CCNC: 48 U/L (ref 10–40)
AST SERPL-CCNC: 50 U/L (ref 10–40)
AST SERPL-CCNC: 53 U/L (ref 10–40)
AST SERPL-CCNC: 55 U/L (ref 10–40)
BACTERIA #/AREA URNS AUTO: NORMAL /HPF
BACTERIA #/AREA URNS HPF: NEGATIVE /HPF
BARBITURATES UR QL SCN>200 NG/ML: NEGATIVE
BASOPHILS # BLD AUTO: 0.02 K/UL (ref 0–0.2)
BASOPHILS # BLD AUTO: 0.03 K/UL (ref 0–0.2)
BASOPHILS # BLD AUTO: 0.04 K/UL (ref 0–0.2)
BASOPHILS # BLD AUTO: 0.05 K/UL (ref 0–0.2)
BASOPHILS NFR BLD: 0.1 % (ref 0–1.9)
BASOPHILS NFR BLD: 0.2 % (ref 0–1.9)
BASOPHILS NFR BLD: 0.3 % (ref 0–1.9)
BASOPHILS NFR BLD: 1.2 % (ref 0–1.9)
BENZODIAZ UR QL SCN>200 NG/ML: NEGATIVE
BILIRUB SERPL-MCNC: 0.3 MG/DL (ref 0.1–1)
BILIRUB SERPL-MCNC: 0.5 MG/DL (ref 0.1–1)
BILIRUB SERPL-MCNC: 0.6 MG/DL (ref 0.1–1)
BILIRUB SERPL-MCNC: 0.8 MG/DL (ref 0.1–1)
BILIRUB SERPL-MCNC: 0.9 MG/DL (ref 0.1–1)
BILIRUB SERPL-MCNC: 1 MG/DL (ref 0.1–1)
BILIRUB SERPL-MCNC: 1.2 MG/DL (ref 0.1–1)
BILIRUB UR QL STRIP: NEGATIVE
BLD GP AB SCN CELLS X3 SERPL QL: NORMAL
BLD GP AB SCN CELLS X3 SERPL QL: NORMAL
BUN SERPL-MCNC: 13 MG/DL (ref 8–23)
BUN SERPL-MCNC: 14 MG/DL (ref 8–23)
BUN SERPL-MCNC: 18 MG/DL (ref 8–23)
BUN SERPL-MCNC: 21 MG/DL (ref 8–23)
BURR CELLS BLD QL SMEAR: ABNORMAL
BZE UR QL SCN: NEGATIVE
CALCIUM SERPL-MCNC: 8 MG/DL (ref 8.7–10.5)
CALCIUM SERPL-MCNC: 8.8 MG/DL (ref 8.7–10.5)
CALCIUM SERPL-MCNC: 8.9 MG/DL (ref 8.7–10.5)
CALCIUM SERPL-MCNC: 9 MG/DL (ref 8.7–10.5)
CALCIUM SERPL-MCNC: 9.1 MG/DL (ref 8.7–10.5)
CALCIUM SERPL-MCNC: 9.2 MG/DL (ref 8.7–10.5)
CALCIUM SERPL-MCNC: 9.6 MG/DL (ref 8.7–10.5)
CANNABINOIDS UR QL SCN: NEGATIVE
CHLORIDE SERPL-SCNC: 104 MMOL/L (ref 95–110)
CHLORIDE SERPL-SCNC: 105 MMOL/L (ref 95–110)
CHLORIDE SERPL-SCNC: 108 MMOL/L (ref 95–110)
CHLORIDE SERPL-SCNC: 129 MMOL/L (ref 95–110)
CHLORIDE SERPL-SCNC: >130 MMOL/L (ref 95–110)
CHOLEST SERPL-MCNC: 150 MG/DL (ref 120–199)
CHOLEST SERPL-MCNC: 200 MG/DL (ref 120–199)
CHOLEST/HDLC SERPL: 2.4 {RATIO} (ref 2–5)
CHOLEST/HDLC SERPL: 3 {RATIO} (ref 2–5)
CLARITY UR REFRACT.AUTO: CLEAR
CLARITY UR REFRACT.AUTO: CLEAR
CLARITY UR: CLEAR
CO2 SERPL-SCNC: 15 MMOL/L (ref 23–29)
CO2 SERPL-SCNC: 17 MMOL/L (ref 23–29)
CO2 SERPL-SCNC: 17 MMOL/L (ref 23–29)
CO2 SERPL-SCNC: 18 MMOL/L (ref 23–29)
CO2 SERPL-SCNC: 19 MMOL/L (ref 23–29)
CO2 SERPL-SCNC: 21 MMOL/L (ref 23–29)
CO2 SERPL-SCNC: 22 MMOL/L (ref 23–29)
COLOR UR AUTO: COLORLESS
COLOR UR AUTO: COLORLESS
COLOR UR: COLORLESS
CREAT SERPL-MCNC: 0.6 MG/DL (ref 0.5–1.4)
CREAT SERPL-MCNC: 0.6 MG/DL (ref 0.5–1.4)
CREAT SERPL-MCNC: 0.7 MG/DL (ref 0.5–1.4)
CREAT SERPL-MCNC: 0.8 MG/DL (ref 0.5–1.4)
CREAT SERPL-MCNC: 1.1 MG/DL (ref 0.5–1.4)
CREAT SERPL-MCNC: 1.2 MG/DL (ref 0.5–1.4)
CREAT UR-MCNC: 11 MG/DL (ref 15–325)
DACRYOCYTES BLD QL SMEAR: ABNORMAL
DELSYS: ABNORMAL
DIFFERENTIAL METHOD: ABNORMAL
EOSINOPHIL # BLD AUTO: 0 K/UL (ref 0–0.5)
EOSINOPHIL # BLD AUTO: 0.2 K/UL (ref 0–0.5)
EOSINOPHIL NFR BLD: 0.1 % (ref 0–8)
EOSINOPHIL NFR BLD: 0.1 % (ref 0–8)
EOSINOPHIL NFR BLD: 0.2 % (ref 0–8)
EOSINOPHIL NFR BLD: 5.8 % (ref 0–8)
ERYTHROCYTE [DISTWIDTH] IN BLOOD BY AUTOMATED COUNT: 13.5 % (ref 11.5–14.5)
ERYTHROCYTE [DISTWIDTH] IN BLOOD BY AUTOMATED COUNT: 13.7 % (ref 11.5–14.5)
ERYTHROCYTE [DISTWIDTH] IN BLOOD BY AUTOMATED COUNT: 14 % (ref 11.5–14.5)
ERYTHROCYTE [DISTWIDTH] IN BLOOD BY AUTOMATED COUNT: 14 % (ref 11.5–14.5)
ERYTHROCYTE [SEDIMENTATION RATE] IN BLOOD BY WESTERGREN METHOD: 14 MM/H
ERYTHROCYTE [SEDIMENTATION RATE] IN BLOOD BY WESTERGREN METHOD: 26 MM/H
EST. GFR  (NO RACE VARIABLE): 45.2 ML/MIN/1.73 M^2
EST. GFR  (NO RACE VARIABLE): 50.2 ML/MIN/1.73 M^2
EST. GFR  (NO RACE VARIABLE): >60 ML/MIN/1.73 M^2
ESTIMATED AVG GLUCOSE: 108 MG/DL (ref 68–131)
ESTIMATED AVG GLUCOSE: 111 MG/DL (ref 68–131)
ETHANOL SERPL-MCNC: <5 MG/DL
FIO2: 100
FIO2: 40
FIO2: 50
FLOW: 10
GLUCOSE SERPL-MCNC: 163 MG/DL (ref 70–110)
GLUCOSE SERPL-MCNC: 182 MG/DL (ref 70–110)
GLUCOSE SERPL-MCNC: 225 MG/DL (ref 70–110)
GLUCOSE SERPL-MCNC: 240 MG/DL (ref 70–110)
GLUCOSE SERPL-MCNC: 252 MG/DL (ref 70–110)
GLUCOSE SERPL-MCNC: 253 MG/DL (ref 70–110)
GLUCOSE SERPL-MCNC: 380 MG/DL (ref 70–110)
GLUCOSE SERPL-MCNC: 627 MG/DL (ref 70–110)
GLUCOSE SERPL-MCNC: 86 MG/DL (ref 70–110)
GLUCOSE UR QL STRIP: ABNORMAL
GLUCOSE UR QL STRIP: NEGATIVE
GLUCOSE UR QL STRIP: NEGATIVE
HBA1C MFR BLD: 5.4 % (ref 4–5.6)
HBA1C MFR BLD: 5.5 % (ref 4–5.6)
HCO3 UR-SCNC: 16.8 MMOL/L (ref 24–28)
HCO3 UR-SCNC: 17.3 MMOL/L (ref 24–28)
HCO3 UR-SCNC: 19.3 MMOL/L (ref 24–28)
HCO3 UR-SCNC: 22.3 MMOL/L (ref 24–28)
HCO3 UR-SCNC: 24 MMOL/L (ref 24–28)
HCO3 UR-SCNC: 24.4 MMOL/L (ref 24–28)
HCT VFR BLD AUTO: 36.6 % (ref 37–48.5)
HCT VFR BLD AUTO: 37.2 % (ref 37–48.5)
HCT VFR BLD AUTO: 38.3 % (ref 37–48.5)
HCT VFR BLD AUTO: 38.8 % (ref 37–48.5)
HCT VFR BLD CALC: 28 %PCV (ref 36–54)
HCT VFR BLD CALC: 34 %PCV (ref 36–54)
HCT VFR BLD CALC: 39 %PCV (ref 36–54)
HCT VFR BLD CALC: ABNORMAL %PCV
HCV AB SERPL QL IA: NORMAL
HDLC SERPL-MCNC: 62 MG/DL (ref 40–75)
HDLC SERPL-MCNC: 67 MG/DL (ref 40–75)
HDLC SERPL: 33.5 % (ref 20–50)
HDLC SERPL: 41.3 % (ref 20–50)
HGB BLD-MCNC: 12.2 G/DL (ref 12–16)
HGB BLD-MCNC: 12.3 G/DL (ref 12–16)
HGB BLD-MCNC: 12.6 G/DL (ref 12–16)
HGB BLD-MCNC: 13 G/DL (ref 12–16)
HGB UR QL STRIP: ABNORMAL
HIV 1+2 AB+HIV1 P24 AG SERPL QL IA: NORMAL
HYALINE CASTS #/AREA URNS LPF: 1 /LPF
IMM GRANULOCYTES # BLD AUTO: 0.02 K/UL (ref 0–0.04)
IMM GRANULOCYTES # BLD AUTO: 0.06 K/UL (ref 0–0.04)
IMM GRANULOCYTES # BLD AUTO: 0.08 K/UL (ref 0–0.04)
IMM GRANULOCYTES # BLD AUTO: 0.1 K/UL (ref 0–0.04)
IMM GRANULOCYTES NFR BLD AUTO: 0.4 % (ref 0–0.5)
IMM GRANULOCYTES NFR BLD AUTO: 0.5 % (ref 0–0.5)
IMM GRANULOCYTES NFR BLD AUTO: 0.6 % (ref 0–0.5)
IMM GRANULOCYTES NFR BLD AUTO: 0.6 % (ref 0–0.5)
INR PPP: 1 (ref 0.8–1.2)
KETONES UR QL STRIP: ABNORMAL
KETONES UR QL STRIP: ABNORMAL
KETONES UR QL STRIP: NEGATIVE
LACTATE SERPL-SCNC: 2.4 MMOL/L (ref 0.5–1.9)
LACTATE SERPL-SCNC: 2.4 MMOL/L (ref 0.5–2.2)
LDLC SERPL CALC-MCNC: 119.2 MG/DL (ref 63–159)
LDLC SERPL CALC-MCNC: 77.8 MG/DL (ref 63–159)
LEUKOCYTE ESTERASE UR QL STRIP: NEGATIVE
LYMPHOCYTES # BLD AUTO: 0.5 K/UL (ref 1–4.8)
LYMPHOCYTES # BLD AUTO: 0.6 K/UL (ref 1–4.8)
LYMPHOCYTES # BLD AUTO: 0.7 K/UL (ref 1–4.8)
LYMPHOCYTES # BLD AUTO: 1.1 K/UL (ref 1–4.8)
LYMPHOCYTES NFR BLD: 2.8 % (ref 18–48)
LYMPHOCYTES NFR BLD: 33 % (ref 18–48)
LYMPHOCYTES NFR BLD: 4.2 % (ref 18–48)
LYMPHOCYTES NFR BLD: 5.2 % (ref 18–48)
MAGNESIUM SERPL-MCNC: 2.6 MG/DL (ref 1.6–2.6)
MCH RBC QN AUTO: 29.6 PG (ref 27–31)
MCH RBC QN AUTO: 29.7 PG (ref 27–31)
MCH RBC QN AUTO: 29.8 PG (ref 27–31)
MCH RBC QN AUTO: 29.9 PG (ref 27–31)
MCHC RBC AUTO-ENTMCNC: 32.8 G/DL (ref 32–36)
MCHC RBC AUTO-ENTMCNC: 32.9 G/DL (ref 32–36)
MCHC RBC AUTO-ENTMCNC: 33.5 G/DL (ref 32–36)
MCHC RBC AUTO-ENTMCNC: 33.6 G/DL (ref 32–36)
MCV RBC AUTO: 88 FL (ref 82–98)
MCV RBC AUTO: 89 FL (ref 82–98)
MCV RBC AUTO: 90 FL (ref 82–98)
MCV RBC AUTO: 91 FL (ref 82–98)
MICROSCOPIC COMMENT: NORMAL
MIN VOL: 10.6
MIN VOL: 4.33
MODE: ABNORMAL
MONOCYTES # BLD AUTO: 0.3 K/UL (ref 0.3–1)
MONOCYTES # BLD AUTO: 0.7 K/UL (ref 0.3–1)
MONOCYTES # BLD AUTO: 1 K/UL (ref 0.3–1)
MONOCYTES # BLD AUTO: 1.2 K/UL (ref 0.3–1)
MONOCYTES NFR BLD: 5.6 % (ref 4–15)
MONOCYTES NFR BLD: 6.7 % (ref 4–15)
MONOCYTES NFR BLD: 6.9 % (ref 4–15)
MONOCYTES NFR BLD: 9.6 % (ref 4–15)
NEUTROPHILS # BLD AUTO: 1.7 K/UL (ref 1.8–7.7)
NEUTROPHILS # BLD AUTO: 11.3 K/UL (ref 1.8–7.7)
NEUTROPHILS # BLD AUTO: 12.7 K/UL (ref 1.8–7.7)
NEUTROPHILS # BLD AUTO: 16.4 K/UL (ref 1.8–7.7)
NEUTROPHILS NFR BLD: 49.8 % (ref 38–73)
NEUTROPHILS NFR BLD: 88.2 % (ref 38–73)
NEUTROPHILS NFR BLD: 88.3 % (ref 38–73)
NEUTROPHILS NFR BLD: 89.6 % (ref 38–73)
NITRITE UR QL STRIP: NEGATIVE
NONHDLC SERPL-MCNC: 133 MG/DL
NONHDLC SERPL-MCNC: 88 MG/DL
NRBC BLD-RTO: 0 /100 WBC
OPIATES UR QL SCN: NEGATIVE
PCO2 BLDA: 26.4 MMHG (ref 35–45)
PCO2 BLDA: 26.7 MMHG (ref 35–45)
PCO2 BLDA: 36 MMHG (ref 35–45)
PCO2 BLDA: 36.4 MMHG (ref 35–45)
PCO2 BLDA: 36.6 MMHG (ref 35–45)
PCO2 BLDA: 68.9 MMHG (ref 35–45)
PCP UR QL SCN>25 NG/ML: NEGATIVE
PEEP: 3
PEEP: 5
PEEP: 5
PH SMN: 7.15 [PH] (ref 7.35–7.45)
PH SMN: 7.34 [PH] (ref 7.35–7.45)
PH SMN: 7.39 [PH] (ref 7.35–7.45)
PH SMN: 7.41 [PH] (ref 7.35–7.45)
PH SMN: 7.42 [PH] (ref 7.35–7.45)
PH SMN: 7.43 [PH] (ref 7.35–7.45)
PH UR STRIP: 6 [PH] (ref 5–8)
PH UR STRIP: 7 [PH] (ref 5–8)
PH UR STRIP: 8 [PH] (ref 5–8)
PHOSPHATE SERPL-MCNC: 2.6 MG/DL (ref 2.7–4.5)
PIP: 16
PIP: 21
PLATELET # BLD AUTO: 166 K/UL (ref 150–450)
PLATELET # BLD AUTO: 170 K/UL (ref 150–450)
PLATELET # BLD AUTO: 185 K/UL (ref 150–450)
PLATELET # BLD AUTO: 219 K/UL (ref 150–450)
PLATELET BLD QL SMEAR: ABNORMAL
PMV BLD AUTO: 10.8 FL (ref 9.2–12.9)
PMV BLD AUTO: 10.9 FL (ref 9.2–12.9)
PMV BLD AUTO: 10.9 FL (ref 9.2–12.9)
PMV BLD AUTO: 11.8 FL (ref 9.2–12.9)
PO2 BLDA: 149 MMHG (ref 80–100)
PO2 BLDA: 315 MMHG (ref 80–100)
PO2 BLDA: 593 MMHG (ref 80–100)
PO2 BLDA: 73 MMHG (ref 80–100)
PO2 BLDA: 81 MMHG (ref 80–100)
PO2 BLDA: 97 MMHG (ref 80–100)
POC BE: -3 MMOL/L
POC BE: -5 MMOL/L
POC BE: -6 MMOL/L
POC BE: -7 MMOL/L
POC BE: -8 MMOL/L
POC BE: 0 MMOL/L
POC IONIZED CALCIUM: 1.18 MMOL/L (ref 1.06–1.42)
POC IONIZED CALCIUM: 1.2 MMOL/L (ref 1.06–1.42)
POC IONIZED CALCIUM: 1.28 MMOL/L (ref 1.06–1.42)
POC IONIZED CALCIUM: 1.29 MMOL/L (ref 1.06–1.42)
POC SATURATED O2: 100 % (ref 95–100)
POC SATURATED O2: 100 % (ref 95–100)
POC SATURATED O2: 95 % (ref 95–100)
POC SATURATED O2: 96 % (ref 95–100)
POC SATURATED O2: 97 % (ref 95–100)
POC SATURATED O2: 99 % (ref 95–100)
POC TCO2: 18 MMOL/L (ref 23–27)
POC TCO2: 18 MMOL/L (ref 23–27)
POC TCO2: 20 MMOL/L (ref 23–27)
POC TCO2: 23 MMOL/L (ref 23–27)
POC TCO2: 25 MMOL/L (ref 23–27)
POC TCO2: 26 MMOL/L (ref 23–27)
POCT GLUCOSE: >500 MG/DL (ref 70–110)
POCT GLUCOSE: >500 MG/DL (ref 70–110)
POIKILOCYTOSIS BLD QL SMEAR: SLIGHT
POLYCHROMASIA BLD QL SMEAR: ABNORMAL
POTASSIUM BLD-SCNC: 3 MMOL/L (ref 3.5–5.1)
POTASSIUM BLD-SCNC: 3.1 MMOL/L (ref 3.5–5.1)
POTASSIUM BLD-SCNC: 3.1 MMOL/L (ref 3.5–5.1)
POTASSIUM BLD-SCNC: ABNORMAL MMOL/L
POTASSIUM SERPL-SCNC: 2.9 MMOL/L (ref 3.5–5.1)
POTASSIUM SERPL-SCNC: 2.9 MMOL/L (ref 3.5–5.1)
POTASSIUM SERPL-SCNC: 3.1 MMOL/L (ref 3.5–5.1)
POTASSIUM SERPL-SCNC: 3.2 MMOL/L (ref 3.5–5.1)
POTASSIUM SERPL-SCNC: 3.5 MMOL/L (ref 3.5–5.1)
POTASSIUM SERPL-SCNC: 3.7 MMOL/L (ref 3.5–5.1)
POTASSIUM SERPL-SCNC: 4.2 MMOL/L (ref 3.5–5.1)
PROT SERPL-MCNC: 4.7 G/DL (ref 6–8.4)
PROT SERPL-MCNC: 5.9 G/DL (ref 6–8.4)
PROT SERPL-MCNC: 6.5 G/DL (ref 6–8.4)
PROT SERPL-MCNC: 6.9 G/DL (ref 6–8.4)
PROT SERPL-MCNC: 7.6 G/DL (ref 6–8.4)
PROT UR QL STRIP: ABNORMAL
PROT UR QL STRIP: NEGATIVE
PROT UR QL STRIP: NEGATIVE
PROTHROMBIN TIME: 10.8 SEC (ref 9–12.5)
RBC # BLD AUTO: 4.09 M/UL (ref 4–5.4)
RBC # BLD AUTO: 4.15 M/UL (ref 4–5.4)
RBC # BLD AUTO: 4.24 M/UL (ref 4–5.4)
RBC # BLD AUTO: 4.35 M/UL (ref 4–5.4)
RBC #/AREA URNS AUTO: 1 /HPF (ref 0–4)
RBC #/AREA URNS AUTO: 2 /HPF (ref 0–4)
RBC #/AREA URNS HPF: 1 /HPF (ref 0–4)
SAMPLE: ABNORMAL
SAMPLE: NORMAL
SITE: ABNORMAL
SODIUM BLD-SCNC: 136 MMOL/L (ref 136–145)
SODIUM BLD-SCNC: 160 MMOL/L (ref 136–145)
SODIUM BLD-SCNC: 176 MMOL/L (ref 136–145)
SODIUM BLD-SCNC: >180 MMOL/L (ref 136–145)
SODIUM SERPL-SCNC: 136 MMOL/L (ref 136–145)
SODIUM SERPL-SCNC: 137 MMOL/L (ref 136–145)
SODIUM SERPL-SCNC: 141 MMOL/L (ref 136–145)
SODIUM SERPL-SCNC: 156 MMOL/L (ref 136–145)
SODIUM SERPL-SCNC: 159 MMOL/L (ref 136–145)
SODIUM SERPL-SCNC: 176 MMOL/L (ref 136–145)
SODIUM SERPL-SCNC: 176 MMOL/L (ref 136–145)
SP GR UR STRIP: 1 (ref 1–1.03)
SP GR UR STRIP: 1.01 (ref 1–1.03)
SP GR UR STRIP: 1.01 (ref 1–1.03)
SP02: 100
SP02: 96
SP02: 97
SP02: 99
SPECIMEN OUTDATE: NORMAL
SPECIMEN OUTDATE: NORMAL
SQUAMOUS #/AREA URNS AUTO: 0 /HPF
SQUAMOUS #/AREA URNS AUTO: 3 /HPF
SQUAMOUS #/AREA URNS HPF: 0 /HPF
T4 FREE SERPL-MCNC: 1.2 NG/DL (ref 0.71–1.51)
TOXICOLOGY INFORMATION: ABNORMAL
TRIGL SERPL-MCNC: 51 MG/DL (ref 30–150)
TRIGL SERPL-MCNC: 69 MG/DL (ref 30–150)
TROPONIN I SERPL DL<=0.01 NG/ML-MCNC: 0.03 NG/ML (ref 0–0.03)
TROPONIN I SERPL DL<=0.01 NG/ML-MCNC: 0.08 NG/ML (ref 0–0.03)
TROPONIN I SERPL HS-MCNC: 11.5 PG/ML (ref 0–14.9)
TSH SERPL DL<=0.005 MIU/L-ACNC: 0.55 UIU/ML (ref 0.4–4)
TSH SERPL DL<=0.005 MIU/L-ACNC: 1.25 UIU/ML (ref 0.4–4)
URN SPEC COLLECT METH UR: ABNORMAL
UROBILINOGEN UR STRIP-ACNC: NEGATIVE EU/DL
VT: 300
VT: 400
WBC # BLD AUTO: 12.79 K/UL (ref 3.9–12.7)
WBC # BLD AUTO: 14.4 K/UL (ref 3.9–12.7)
WBC # BLD AUTO: 18.33 K/UL (ref 3.9–12.7)
WBC # BLD AUTO: 3.42 K/UL (ref 3.9–12.7)
WBC #/AREA URNS AUTO: 0 /HPF (ref 0–5)
WBC #/AREA URNS AUTO: 3 /HPF (ref 0–5)
WBC #/AREA URNS HPF: 0 /HPF (ref 0–5)
YEAST URNS QL MICRO: NORMAL

## 2023-01-01 PROCEDURE — 99999 PR PBB SHADOW E&M-EST. PATIENT-LVL III: ICD-10-PCS | Mod: PBBFAC,,, | Performed by: FAMILY MEDICINE

## 2023-01-01 PROCEDURE — 83605 ASSAY OF LACTIC ACID: CPT | Performed by: EMERGENCY MEDICINE

## 2023-01-01 PROCEDURE — 36620 INSERTION CATHETER ARTERY: CPT | Mod: ,,, | Performed by: NURSE PRACTITIONER

## 2023-01-01 PROCEDURE — 80053 COMPREHEN METABOLIC PANEL: CPT

## 2023-01-01 PROCEDURE — S5010 5% DEXTROSE AND 0.45% SALINE: HCPCS | Performed by: PHYSICIAN ASSISTANT

## 2023-01-01 PROCEDURE — 25000003 PHARM REV CODE 250: Performed by: NEUROLOGICAL SURGERY

## 2023-01-01 PROCEDURE — 99900035 HC TECH TIME PER 15 MIN (STAT)

## 2023-01-01 PROCEDURE — 99291 PR CRITICAL CARE, E/M 30-74 MINUTES: ICD-10-PCS | Mod: 25,GC,, | Performed by: PSYCHIATRY & NEUROLOGY

## 2023-01-01 PROCEDURE — D9220A PRA ANESTHESIA: Mod: CRNA,,, | Performed by: NURSE ANESTHETIST, CERTIFIED REGISTERED

## 2023-01-01 PROCEDURE — 61322 CRNEC/CRNOT DCMPRV W/O LOBEC: CPT | Mod: ,,, | Performed by: NEUROLOGICAL SURGERY

## 2023-01-01 PROCEDURE — 25000003 PHARM REV CODE 250: Performed by: STUDENT IN AN ORGANIZED HEALTH CARE EDUCATION/TRAINING PROGRAM

## 2023-01-01 PROCEDURE — 63600175 PHARM REV CODE 636 W HCPCS: Performed by: PHYSICIAN ASSISTANT

## 2023-01-01 PROCEDURE — 87389 HIV-1 AG W/HIV-1&-2 AB AG IA: CPT | Performed by: PHYSICIAN ASSISTANT

## 2023-01-01 PROCEDURE — 86803 HEPATITIS C AB TEST: CPT | Performed by: PHYSICIAN ASSISTANT

## 2023-01-01 PROCEDURE — 63600175 PHARM REV CODE 636 W HCPCS: Performed by: NEUROLOGICAL SURGERY

## 2023-01-01 PROCEDURE — C1765 ADHESION BARRIER: HCPCS | Performed by: NEUROLOGICAL SURGERY

## 2023-01-01 PROCEDURE — 80053 COMPREHEN METABOLIC PANEL: CPT | Mod: 91 | Performed by: PHYSICIAN ASSISTANT

## 2023-01-01 PROCEDURE — 84295 ASSAY OF SERUM SODIUM: CPT

## 2023-01-01 PROCEDURE — 83605 ASSAY OF LACTIC ACID: CPT | Mod: 91 | Performed by: EMERGENCY MEDICINE

## 2023-01-01 PROCEDURE — 99223 1ST HOSP IP/OBS HIGH 75: CPT | Mod: 57,GC,, | Performed by: NEUROLOGICAL SURGERY

## 2023-01-01 PROCEDURE — 80053 COMPREHEN METABOLIC PANEL: CPT | Performed by: EMERGENCY MEDICINE

## 2023-01-01 PROCEDURE — 25500020 PHARM REV CODE 255: Performed by: EMERGENCY MEDICINE

## 2023-01-01 PROCEDURE — 80307 DRUG TEST PRSMV CHEM ANLYZR: CPT | Performed by: EMERGENCY MEDICINE

## 2023-01-01 PROCEDURE — 99292 CRITICAL CARE ADDL 30 MIN: CPT

## 2023-01-01 PROCEDURE — 83036 HEMOGLOBIN GLYCOSYLATED A1C: CPT | Performed by: FAMILY MEDICINE

## 2023-01-01 PROCEDURE — 84443 ASSAY THYROID STIM HORMONE: CPT

## 2023-01-01 PROCEDURE — 25000003 PHARM REV CODE 250: Performed by: NURSE PRACTITIONER

## 2023-01-01 PROCEDURE — 99900026 HC AIRWAY MAINTENANCE (STAT)

## 2023-01-01 PROCEDURE — 99204 OFFICE O/P NEW MOD 45 MIN: CPT | Mod: S$PBB,,, | Performed by: FAMILY MEDICINE

## 2023-01-01 PROCEDURE — 63600175 PHARM REV CODE 636 W HCPCS: Performed by: PSYCHIATRY & NEUROLOGY

## 2023-01-01 PROCEDURE — 82803 BLOOD GASES ANY COMBINATION: CPT

## 2023-01-01 PROCEDURE — 96374 THER/PROPH/DIAG INJ IV PUSH: CPT

## 2023-01-01 PROCEDURE — 61322 PR OPEN SKULL,DECOMPRES,W/DURAPLASTY: ICD-10-PCS | Mod: ,,, | Performed by: NEUROLOGICAL SURGERY

## 2023-01-01 PROCEDURE — 99213 OFFICE O/P EST LOW 20 MIN: CPT | Mod: PBBFAC,PO | Performed by: FAMILY MEDICINE

## 2023-01-01 PROCEDURE — 84443 ASSAY THYROID STIM HORMONE: CPT | Performed by: FAMILY MEDICINE

## 2023-01-01 PROCEDURE — 93010 EKG 12-LEAD: ICD-10-PCS | Mod: ,,, | Performed by: INTERNAL MEDICINE

## 2023-01-01 PROCEDURE — 99223 PR INITIAL HOSPITAL CARE,LEVL III: ICD-10-PCS | Mod: 57,GC,, | Performed by: NEUROLOGICAL SURGERY

## 2023-01-01 PROCEDURE — 85610 PROTHROMBIN TIME: CPT | Performed by: EMERGENCY MEDICINE

## 2023-01-01 PROCEDURE — 93010 ELECTROCARDIOGRAM REPORT: CPT | Mod: ,,, | Performed by: INTERNAL MEDICINE

## 2023-01-01 PROCEDURE — 94002 VENT MGMT INPAT INIT DAY: CPT

## 2023-01-01 PROCEDURE — 80053 COMPREHEN METABOLIC PANEL: CPT | Performed by: FAMILY MEDICINE

## 2023-01-01 PROCEDURE — 63600175 PHARM REV CODE 636 W HCPCS: Performed by: NURSE ANESTHETIST, CERTIFIED REGISTERED

## 2023-01-01 PROCEDURE — 96366 THER/PROPH/DIAG IV INF ADDON: CPT

## 2023-01-01 PROCEDURE — 25000003 PHARM REV CODE 250: Performed by: PSYCHIATRY & NEUROLOGY

## 2023-01-01 PROCEDURE — 27100171 HC OXYGEN HIGH FLOW UP TO 24 HOURS

## 2023-01-01 PROCEDURE — 99291 CRITICAL CARE FIRST HOUR: CPT

## 2023-01-01 PROCEDURE — 83036 HEMOGLOBIN GLYCOSYLATED A1C: CPT

## 2023-01-01 PROCEDURE — 25000003 PHARM REV CODE 250: Performed by: PHYSICIAN ASSISTANT

## 2023-01-01 PROCEDURE — 27100025 HC TUBING, SET FLUID WARMER: Performed by: ANESTHESIOLOGY

## 2023-01-01 PROCEDURE — 86900 BLOOD TYPING SEROLOGIC ABO: CPT | Performed by: NEUROLOGICAL SURGERY

## 2023-01-01 PROCEDURE — 82330 ASSAY OF CALCIUM: CPT

## 2023-01-01 PROCEDURE — 86900 BLOOD TYPING SEROLOGIC ABO: CPT | Mod: 91 | Performed by: EMERGENCY MEDICINE

## 2023-01-01 PROCEDURE — 81001 URINALYSIS AUTO W/SCOPE: CPT | Mod: 59 | Performed by: EMERGENCY MEDICINE

## 2023-01-01 PROCEDURE — 94761 N-INVAS EAR/PLS OXIMETRY MLT: CPT

## 2023-01-01 PROCEDURE — 31500 INSERT EMERGENCY AIRWAY: CPT

## 2023-01-01 PROCEDURE — 99238 HOSP IP/OBS DSCHRG MGMT 30/<: CPT | Mod: FS,,, | Performed by: PHYSICIAN ASSISTANT

## 2023-01-01 PROCEDURE — 84132 ASSAY OF SERUM POTASSIUM: CPT

## 2023-01-01 PROCEDURE — 99238 PR HOSPITAL DISCHARGE DAY,<30 MIN: ICD-10-PCS | Mod: FS,,, | Performed by: PHYSICIAN ASSISTANT

## 2023-01-01 PROCEDURE — 36600 WITHDRAWAL OF ARTERIAL BLOOD: CPT | Mod: 59

## 2023-01-01 PROCEDURE — C1729 CATH, DRAINAGE: HCPCS | Performed by: NEUROLOGICAL SURGERY

## 2023-01-01 PROCEDURE — 93005 ELECTROCARDIOGRAM TRACING: CPT | Mod: 59 | Performed by: INTERNAL MEDICINE

## 2023-01-01 PROCEDURE — 36000711: Performed by: NEUROLOGICAL SURGERY

## 2023-01-01 PROCEDURE — 82565 ASSAY OF CREATININE: CPT

## 2023-01-01 PROCEDURE — 63600175 PHARM REV CODE 636 W HCPCS: Performed by: EMERGENCY MEDICINE

## 2023-01-01 PROCEDURE — 36415 COLL VENOUS BLD VENIPUNCTURE: CPT | Mod: PO | Performed by: FAMILY MEDICINE

## 2023-01-01 PROCEDURE — 36620 ARTERIAL LINE: ICD-10-PCS | Mod: ,,, | Performed by: NURSE PRACTITIONER

## 2023-01-01 PROCEDURE — 82077 ASSAY SPEC XCP UR&BREATH IA: CPT | Performed by: EMERGENCY MEDICINE

## 2023-01-01 PROCEDURE — 96375 TX/PRO/DX INJ NEW DRUG ADDON: CPT

## 2023-01-01 PROCEDURE — 85025 COMPLETE CBC W/AUTO DIFF WBC: CPT | Mod: 91 | Performed by: EMERGENCY MEDICINE

## 2023-01-01 PROCEDURE — 36556 INSERT NON-TUNNEL CV CATH: CPT | Mod: GC,,, | Performed by: PSYCHIATRY & NEUROLOGY

## 2023-01-01 PROCEDURE — 96368 THER/DIAG CONCURRENT INF: CPT

## 2023-01-01 PROCEDURE — 85014 HEMATOCRIT: CPT | Mod: 91

## 2023-01-01 PROCEDURE — 99204 PR OFFICE/OUTPT VISIT, NEW, LEVL IV, 45-59 MIN: ICD-10-PCS | Mod: S$PBB,,, | Performed by: FAMILY MEDICINE

## 2023-01-01 PROCEDURE — 27201423 OPTIME MED/SURG SUP & DEVICES STERILE SUPPLY: Performed by: NEUROLOGICAL SURGERY

## 2023-01-01 PROCEDURE — 25000003 PHARM REV CODE 250

## 2023-01-01 PROCEDURE — 84484 ASSAY OF TROPONIN QUANT: CPT

## 2023-01-01 PROCEDURE — 63600175 PHARM REV CODE 636 W HCPCS

## 2023-01-01 PROCEDURE — 83735 ASSAY OF MAGNESIUM: CPT

## 2023-01-01 PROCEDURE — 94003 VENT MGMT INPAT SUBQ DAY: CPT

## 2023-01-01 PROCEDURE — 36556 PR INSERT NON-TUNNEL CV CATH 5+ YRS OLD: ICD-10-PCS | Mod: GC,,, | Performed by: PSYCHIATRY & NEUROLOGY

## 2023-01-01 PROCEDURE — 80061 LIPID PANEL: CPT

## 2023-01-01 PROCEDURE — D9220A PRA ANESTHESIA: ICD-10-PCS | Mod: ANES,,, | Performed by: ANESTHESIOLOGY

## 2023-01-01 PROCEDURE — 99999 PR PBB SHADOW E&M-EST. PATIENT-LVL III: CPT | Mod: PBBFAC,,, | Performed by: FAMILY MEDICINE

## 2023-01-01 PROCEDURE — 93005 ELECTROCARDIOGRAM TRACING: CPT

## 2023-01-01 PROCEDURE — 99291 CRITICAL CARE FIRST HOUR: CPT | Mod: 25,GC,, | Performed by: PSYCHIATRY & NEUROLOGY

## 2023-01-01 PROCEDURE — 25000003 PHARM REV CODE 250: Performed by: EMERGENCY MEDICINE

## 2023-01-01 PROCEDURE — 84439 ASSAY OF FREE THYROXINE: CPT | Performed by: FAMILY MEDICINE

## 2023-01-01 PROCEDURE — 80053 COMPREHEN METABOLIC PANEL: CPT | Mod: 91 | Performed by: PSYCHIATRY & NEUROLOGY

## 2023-01-01 PROCEDURE — 20000000 HC ICU ROOM

## 2023-01-01 PROCEDURE — 37000009 HC ANESTHESIA EA ADD 15 MINS: Performed by: NEUROLOGICAL SURGERY

## 2023-01-01 PROCEDURE — 80053 COMPREHEN METABOLIC PANEL: CPT | Mod: 91 | Performed by: EMERGENCY MEDICINE

## 2023-01-01 PROCEDURE — 81001 URINALYSIS AUTO W/SCOPE: CPT | Mod: 91

## 2023-01-01 PROCEDURE — 81001 URINALYSIS AUTO W/SCOPE: CPT | Performed by: PHYSICIAN ASSISTANT

## 2023-01-01 PROCEDURE — 80061 LIPID PANEL: CPT | Performed by: FAMILY MEDICINE

## 2023-01-01 PROCEDURE — 86920 COMPATIBILITY TEST SPIN: CPT | Performed by: NEUROLOGICAL SURGERY

## 2023-01-01 PROCEDURE — 85014 HEMATOCRIT: CPT

## 2023-01-01 PROCEDURE — 76536 US EXAM OF HEAD AND NECK: CPT | Mod: TC,PO

## 2023-01-01 PROCEDURE — 84484 ASSAY OF TROPONIN QUANT: CPT | Performed by: EMERGENCY MEDICINE

## 2023-01-01 PROCEDURE — 37799 UNLISTED PX VASCULAR SURGERY: CPT

## 2023-01-01 PROCEDURE — 25000003 PHARM REV CODE 250: Performed by: NURSE ANESTHETIST, CERTIFIED REGISTERED

## 2023-01-01 PROCEDURE — 85730 THROMBOPLASTIN TIME PARTIAL: CPT | Performed by: EMERGENCY MEDICINE

## 2023-01-01 PROCEDURE — 37000008 HC ANESTHESIA 1ST 15 MINUTES: Performed by: NEUROLOGICAL SURGERY

## 2023-01-01 PROCEDURE — 27000221 HC OXYGEN, UP TO 24 HOURS

## 2023-01-01 PROCEDURE — 82962 GLUCOSE BLOOD TEST: CPT

## 2023-01-01 PROCEDURE — 36000710: Performed by: NEUROLOGICAL SURGERY

## 2023-01-01 PROCEDURE — 96365 THER/PROPH/DIAG IV INF INIT: CPT

## 2023-01-01 PROCEDURE — 63600175 PHARM REV CODE 636 W HCPCS: Performed by: NURSE PRACTITIONER

## 2023-01-01 PROCEDURE — 84484 ASSAY OF TROPONIN QUANT: CPT | Mod: 91 | Performed by: EMERGENCY MEDICINE

## 2023-01-01 PROCEDURE — 27200966 HC CLOSED SUCTION SYSTEM

## 2023-01-01 PROCEDURE — D9220A PRA ANESTHESIA: Mod: ANES,,, | Performed by: ANESTHESIOLOGY

## 2023-01-01 PROCEDURE — 85025 COMPLETE CBC W/AUTO DIFF WBC: CPT | Performed by: FAMILY MEDICINE

## 2023-01-01 PROCEDURE — 85025 COMPLETE CBC W/AUTO DIFF WBC: CPT

## 2023-01-01 PROCEDURE — D9220A PRA ANESTHESIA: ICD-10-PCS | Mod: CRNA,,, | Performed by: NURSE ANESTHETIST, CERTIFIED REGISTERED

## 2023-01-01 PROCEDURE — 84100 ASSAY OF PHOSPHORUS: CPT

## 2023-01-01 PROCEDURE — 85025 COMPLETE CBC W/AUTO DIFF WBC: CPT | Performed by: EMERGENCY MEDICINE

## 2023-01-01 PROCEDURE — 27800903 OPTIME MED/SURG SUP & DEVICES OTHER IMPLANTS: Performed by: NEUROLOGICAL SURGERY

## 2023-01-01 DEVICE — DURAMATRIX ONLAY 5X7 MEMBRANE: Type: IMPLANTABLE DEVICE | Site: CRANIAL | Status: FUNCTIONAL

## 2023-01-01 DEVICE — MEMBRANE SEPRAFILM 5 X 6: Type: IMPLANTABLE DEVICE | Site: CRANIAL | Status: FUNCTIONAL

## 2023-01-01 RX ORDER — POTASSIUM CHLORIDE 29.8 MG/ML
40 INJECTION INTRAVENOUS
Status: DISCONTINUED | OUTPATIENT
Start: 2023-01-01 | End: 2023-01-01

## 2023-01-01 RX ORDER — FAMOTIDINE 10 MG/ML
20 INJECTION INTRAVENOUS DAILY
Status: DISCONTINUED | OUTPATIENT
Start: 2023-01-01 | End: 2023-01-01

## 2023-01-01 RX ORDER — MAGNESIUM SULFATE HEPTAHYDRATE 40 MG/ML
4 INJECTION, SOLUTION INTRAVENOUS
Status: DISCONTINUED | OUTPATIENT
Start: 2023-01-01 | End: 2023-01-01

## 2023-01-01 RX ORDER — CEFAZOLIN SODIUM 1 G/3ML
INJECTION, POWDER, FOR SOLUTION INTRAMUSCULAR; INTRAVENOUS
Status: DISCONTINUED | OUTPATIENT
Start: 2023-01-01 | End: 2023-01-01

## 2023-01-01 RX ORDER — MUPIROCIN 20 MG/G
OINTMENT TOPICAL 2 TIMES DAILY
Status: DISCONTINUED | OUTPATIENT
Start: 2023-01-01 | End: 2023-01-01

## 2023-01-01 RX ORDER — LABETALOL HCL 20 MG/4 ML
10 SYRINGE (ML) INTRAVENOUS EVERY 6 HOURS PRN
Status: DISCONTINUED | OUTPATIENT
Start: 2023-01-01 | End: 2023-08-03 | Stop reason: HOSPADM

## 2023-01-01 RX ORDER — SODIUM,POTASSIUM PHOSPHATES 280-250MG
2 POWDER IN PACKET (EA) ORAL
Status: DISCONTINUED | OUTPATIENT
Start: 2023-01-01 | End: 2023-01-01

## 2023-01-01 RX ORDER — NICARDIPINE HYDROCHLORIDE 0.2 MG/ML
0-15 INJECTION INTRAVENOUS CONTINUOUS
Status: DISCONTINUED | OUTPATIENT
Start: 2023-01-01 | End: 2023-01-01 | Stop reason: HOSPADM

## 2023-01-01 RX ORDER — PROPOFOL 10 MG/ML
0-50 INJECTION, EMULSION INTRAVENOUS CONTINUOUS
Status: DISCONTINUED | OUTPATIENT
Start: 2023-01-01 | End: 2023-01-01 | Stop reason: HOSPADM

## 2023-01-01 RX ORDER — PHENYLEPHRINE HCL IN 0.9% NACL 1 MG/10 ML
100 SYRINGE (ML) INTRAVENOUS ONCE
Status: COMPLETED | OUTPATIENT
Start: 2023-01-01 | End: 2023-01-01

## 2023-01-01 RX ORDER — PROPOFOL 10 MG/ML
0-50 INJECTION, EMULSION INTRAVENOUS CONTINUOUS
Status: DISCONTINUED | OUTPATIENT
Start: 2023-01-01 | End: 2023-01-01

## 2023-01-01 RX ORDER — FENTANYL CITRATE 50 UG/ML
INJECTION, SOLUTION INTRAMUSCULAR; INTRAVENOUS
Status: DISCONTINUED | OUTPATIENT
Start: 2023-01-01 | End: 2023-01-01

## 2023-01-01 RX ORDER — ETOMIDATE 2 MG/ML
INJECTION INTRAVENOUS CODE/TRAUMA/SEDATION MEDICATION
Status: DISCONTINUED | OUTPATIENT
Start: 2023-01-01 | End: 2023-01-01 | Stop reason: HOSPADM

## 2023-01-01 RX ORDER — DESMOPRESSIN ACETATE 4 UG/ML
2 INJECTION, SOLUTION INTRAVENOUS; SUBCUTANEOUS ONCE
Status: COMPLETED | OUTPATIENT
Start: 2023-01-01 | End: 2023-01-01

## 2023-01-01 RX ORDER — NICARDIPINE HYDROCHLORIDE 0.2 MG/ML
0-15 INJECTION INTRAVENOUS CONTINUOUS
Status: DISCONTINUED | OUTPATIENT
Start: 2023-01-01 | End: 2023-01-01

## 2023-01-01 RX ORDER — DEXTROSE MONOHYDRATE AND SODIUM CHLORIDE 5; .45 G/100ML; G/100ML
INJECTION, SOLUTION INTRAVENOUS CONTINUOUS
Status: DISCONTINUED | OUTPATIENT
Start: 2023-01-01 | End: 2023-01-01

## 2023-01-01 RX ORDER — DEXTROSE MONOHYDRATE 50 MG/ML
INJECTION, SOLUTION INTRAVENOUS CONTINUOUS
Status: DISCONTINUED | OUTPATIENT
Start: 2023-01-01 | End: 2023-08-03 | Stop reason: HOSPADM

## 2023-01-01 RX ORDER — ONDANSETRON 2 MG/ML
4 INJECTION INTRAMUSCULAR; INTRAVENOUS ONCE
Status: DISCONTINUED | OUTPATIENT
Start: 2023-01-01 | End: 2023-01-01

## 2023-01-01 RX ORDER — SODIUM CHLORIDE 9 MG/ML
INJECTION, SOLUTION INTRAVENOUS CONTINUOUS
Status: DISCONTINUED | OUTPATIENT
Start: 2023-01-01 | End: 2023-01-01

## 2023-01-01 RX ORDER — HYDROMORPHONE HYDROCHLORIDE 1 MG/ML
0.2 INJECTION, SOLUTION INTRAMUSCULAR; INTRAVENOUS; SUBCUTANEOUS EVERY 5 MIN PRN
Status: CANCELLED | OUTPATIENT
Start: 2023-01-01

## 2023-01-01 RX ORDER — MAGNESIUM SULFATE HEPTAHYDRATE 40 MG/ML
2 INJECTION, SOLUTION INTRAVENOUS
Status: DISCONTINUED | OUTPATIENT
Start: 2023-01-01 | End: 2023-01-01

## 2023-01-01 RX ORDER — AMOXICILLIN 250 MG
1 CAPSULE ORAL 2 TIMES DAILY
Status: DISCONTINUED | OUTPATIENT
Start: 2023-01-01 | End: 2023-01-01

## 2023-01-01 RX ORDER — INSULIN ASPART 100 [IU]/ML
8 INJECTION, SOLUTION INTRAVENOUS; SUBCUTANEOUS ONCE
Status: COMPLETED | OUTPATIENT
Start: 2023-01-01 | End: 2023-01-01

## 2023-01-01 RX ORDER — POTASSIUM CHLORIDE 29.8 MG/ML
80 INJECTION INTRAVENOUS
Status: DISCONTINUED | OUTPATIENT
Start: 2023-01-01 | End: 2023-01-01

## 2023-01-01 RX ORDER — LORAZEPAM 2 MG/ML
0.5 INJECTION INTRAMUSCULAR EVERY 30 MIN PRN
Status: DISCONTINUED | OUTPATIENT
Start: 2023-01-01 | End: 2023-01-01

## 2023-01-01 RX ORDER — METOPROLOL TARTRATE 1 MG/ML
5 INJECTION, SOLUTION INTRAVENOUS EVERY 5 MIN PRN
Status: DISCONTINUED | OUTPATIENT
Start: 2023-01-01 | End: 2023-01-01

## 2023-01-01 RX ORDER — FAMOTIDINE 10 MG/ML
20 INJECTION INTRAVENOUS 2 TIMES DAILY
Status: DISCONTINUED | OUTPATIENT
Start: 2023-01-01 | End: 2023-01-01

## 2023-01-01 RX ORDER — DESMOPRESSIN ACETATE 4 UG/ML
1 INJECTION, SOLUTION INTRAVENOUS; SUBCUTANEOUS ONCE
Status: CANCELLED | OUTPATIENT
Start: 2023-01-01

## 2023-01-01 RX ORDER — LANOLIN ALCOHOL/MO/W.PET/CERES
800 CREAM (GRAM) TOPICAL
Status: DISCONTINUED | OUTPATIENT
Start: 2023-01-01 | End: 2023-01-01

## 2023-01-01 RX ORDER — BACITRACIN ZINC 500 UNIT/G
OINTMENT (GRAM) TOPICAL
Status: DISCONTINUED | OUTPATIENT
Start: 2023-01-01 | End: 2023-01-01 | Stop reason: HOSPADM

## 2023-01-01 RX ORDER — SODIUM CHLORIDE, SODIUM LACTATE, POTASSIUM CHLORIDE, CALCIUM CHLORIDE 600; 310; 30; 20 MG/100ML; MG/100ML; MG/100ML; MG/100ML
INJECTION, SOLUTION INTRAVENOUS CONTINUOUS
Status: DISCONTINUED | OUTPATIENT
Start: 2023-01-01 | End: 2023-01-01

## 2023-01-01 RX ORDER — MANNITOL 250 MG/ML
50 INJECTION, SOLUTION INTRAVENOUS ONCE
Status: COMPLETED | OUTPATIENT
Start: 2023-01-01 | End: 2023-01-01

## 2023-01-01 RX ORDER — LEVOTHYROXINE SODIUM 75 UG/1
TABLET ORAL
Qty: 36 TABLET | Refills: 3 | Status: SHIPPED | OUTPATIENT
Start: 2023-01-01

## 2023-01-01 RX ORDER — ATROPINE SULFATE 0.1 MG/ML
INJECTION INTRAVENOUS
Status: DISPENSED
Start: 2023-01-01 | End: 2023-01-01

## 2023-01-01 RX ORDER — SUCCINYLCHOLINE CHLORIDE 20 MG/ML
INJECTION INTRAMUSCULAR; INTRAVENOUS CODE/TRAUMA/SEDATION MEDICATION
Status: DISCONTINUED | OUTPATIENT
Start: 2023-01-01 | End: 2023-01-01 | Stop reason: HOSPADM

## 2023-01-01 RX ORDER — LOSARTAN POTASSIUM AND HYDROCHLOROTHIAZIDE 12.5; 5 MG/1; MG/1
1 TABLET ORAL DAILY
Qty: 90 TABLET | Refills: 3 | Status: SHIPPED | OUTPATIENT
Start: 2023-01-01 | End: 2024-04-13

## 2023-01-01 RX ORDER — 3% SODIUM CHLORIDE 3 G/100ML
50 INJECTION, SOLUTION INTRAVENOUS CONTINUOUS
Status: DISCONTINUED | OUTPATIENT
Start: 2023-01-01 | End: 2023-01-01

## 2023-01-01 RX ORDER — SODIUM CHLORIDE 234 MG/ML
30 INJECTION, SOLUTION INTRAVENOUS ONCE
Status: COMPLETED | OUTPATIENT
Start: 2023-01-01 | End: 2023-01-01

## 2023-01-01 RX ORDER — POTASSIUM CHLORIDE 14.9 MG/ML
60 INJECTION INTRAVENOUS
Status: DISCONTINUED | OUTPATIENT
Start: 2023-01-01 | End: 2023-01-01

## 2023-01-01 RX ORDER — LEVOTHYROXINE SODIUM 50 UG/1
TABLET ORAL
Qty: 60 TABLET | Refills: 3 | Status: SHIPPED | OUTPATIENT
Start: 2023-01-01

## 2023-01-01 RX ORDER — NOREPINEPHRINE BITARTRATE/D5W 4MG/250ML
0-3 PLASTIC BAG, INJECTION (ML) INTRAVENOUS CONTINUOUS
Status: DISCONTINUED | OUTPATIENT
Start: 2023-01-01 | End: 2023-08-03 | Stop reason: HOSPADM

## 2023-01-01 RX ORDER — SODIUM CHLORIDE 0.9 % (FLUSH) 0.9 %
10 SYRINGE (ML) INJECTION
Status: CANCELLED | OUTPATIENT
Start: 2023-01-01

## 2023-01-01 RX ORDER — EPINEPHRINE 1 MG/ML
INJECTION, SOLUTION, CONCENTRATE INTRAVENOUS
Status: DISPENSED
Start: 2023-01-01 | End: 2023-01-01

## 2023-01-01 RX ORDER — MORPHINE SULFATE 2 MG/ML
2 INJECTION, SOLUTION INTRAMUSCULAR; INTRAVENOUS EVERY 4 HOURS PRN
Status: DISCONTINUED | OUTPATIENT
Start: 2023-01-01 | End: 2023-01-01

## 2023-01-01 RX ORDER — LIDOCAINE HYDROCHLORIDE AND EPINEPHRINE 10; 10 MG/ML; UG/ML
INJECTION, SOLUTION INFILTRATION; PERINEURAL
Status: DISCONTINUED | OUTPATIENT
Start: 2023-01-01 | End: 2023-01-01 | Stop reason: HOSPADM

## 2023-01-01 RX ORDER — SODIUM CHLORIDE 0.9 % (FLUSH) 0.9 %
10 SYRINGE (ML) INJECTION
Status: DISCONTINUED | OUTPATIENT
Start: 2023-01-01 | End: 2023-01-01

## 2023-01-01 RX ORDER — ROCURONIUM BROMIDE 10 MG/ML
INJECTION, SOLUTION INTRAVENOUS
Status: DISCONTINUED | OUTPATIENT
Start: 2023-01-01 | End: 2023-01-01

## 2023-01-01 RX ORDER — SODIUM CHLORIDE 9 MG/ML
1000 INJECTION, SOLUTION INTRAVENOUS
Status: COMPLETED | OUTPATIENT
Start: 2023-01-01 | End: 2023-01-01

## 2023-01-01 RX ORDER — ATROPINE SULFATE 10 MG/ML
2 SOLUTION/ DROPS OPHTHALMIC EVERY 4 HOURS PRN
Status: DISCONTINUED | OUTPATIENT
Start: 2023-01-01 | End: 2023-01-01

## 2023-01-01 RX ORDER — PHENYLEPHRINE HCL IN 0.9% NACL 1 MG/10 ML
SYRINGE (ML) INTRAVENOUS
Status: DISPENSED
Start: 2023-01-01 | End: 2023-01-01

## 2023-01-01 RX ADMIN — SODIUM CHLORIDE 1000 ML: 0.9 INJECTION, SOLUTION INTRAVENOUS at 07:08

## 2023-01-01 RX ADMIN — POTASSIUM BICARBONATE 35 MEQ: 391 TABLET, EFFERVESCENT ORAL at 09:08

## 2023-01-01 RX ADMIN — NICARDIPINE HYDROCHLORIDE 2.5 MG/HR: 0.2 INJECTION, SOLUTION INTRAVENOUS at 10:08

## 2023-01-01 RX ADMIN — FENTANYL CITRATE 50 MCG: 50 INJECTION, SOLUTION INTRAMUSCULAR; INTRAVENOUS at 03:08

## 2023-01-01 RX ADMIN — MUPIROCIN: 20 OINTMENT TOPICAL at 09:08

## 2023-01-01 RX ADMIN — POTASSIUM & SODIUM PHOSPHATES POWDER PACK 280-160-250 MG 2 PACKET: 280-160-250 PACK at 12:08

## 2023-01-01 RX ADMIN — CEFAZOLIN 2 G: 330 INJECTION, POWDER, FOR SOLUTION INTRAMUSCULAR; INTRAVENOUS at 03:08

## 2023-01-01 RX ADMIN — VASOPRESSIN 120 MEQ: 20 INJECTION, SOLUTION INTRAVENOUS at 03:08

## 2023-01-01 RX ADMIN — IOHEXOL 100 ML: 350 INJECTION, SOLUTION INTRAVENOUS at 06:08

## 2023-01-01 RX ADMIN — Medication 100 MCG: at 07:08

## 2023-01-01 RX ADMIN — MANNITOL 50 G: 12.5 INJECTION, SOLUTION INTRAVENOUS at 10:08

## 2023-01-01 RX ADMIN — DEXTROSE AND SODIUM CHLORIDE: 5; 450 INJECTION, SOLUTION INTRAVENOUS at 05:08

## 2023-01-01 RX ADMIN — NOREPINEPHRINE BITARTRATE 0.12 MCG/KG/MIN: 4 INJECTION, SOLUTION INTRAVENOUS at 02:08

## 2023-01-01 RX ADMIN — SENNOSIDES AND DOCUSATE SODIUM 1 TABLET: 50; 8.6 TABLET ORAL at 08:08

## 2023-01-01 RX ADMIN — SODIUM CHLORIDE: 9 INJECTION, SOLUTION INTRAVENOUS at 09:08

## 2023-01-01 RX ADMIN — DEXTROSE MONOHYDRATE: 50 INJECTION, SOLUTION INTRAVENOUS at 05:08

## 2023-01-01 RX ADMIN — CEFAZOLIN 1 G: 1 INJECTION, POWDER, FOR SOLUTION INTRAMUSCULAR; INTRAVENOUS at 07:08

## 2023-01-01 RX ADMIN — SODIUM CHLORIDE, SODIUM GLUCONATE, SODIUM ACETATE, POTASSIUM CHLORIDE, MAGNESIUM CHLORIDE, SODIUM PHOSPHATE, DIBASIC, AND POTASSIUM PHOSPHATE: .53; .5; .37; .037; .03; .012; .00082 INJECTION, SOLUTION INTRAVENOUS at 03:08

## 2023-01-01 RX ADMIN — ETOMIDATE 20 MG: 2 INJECTION, SOLUTION INTRAVENOUS at 05:08

## 2023-01-01 RX ADMIN — DEXTROSE MONOHYDRATE: 50 INJECTION, SOLUTION INTRAVENOUS at 11:08

## 2023-01-01 RX ADMIN — SODIUM CHLORIDE: 9 INJECTION, SOLUTION INTRAVENOUS at 06:08

## 2023-01-01 RX ADMIN — ROCURONIUM BROMIDE 50 MG: 10 INJECTION INTRAVENOUS at 03:08

## 2023-01-01 RX ADMIN — DEXTROSE MONOHYDRATE 500 ML: 50 INJECTION, SOLUTION INTRAVENOUS at 05:08

## 2023-01-01 RX ADMIN — NICARDIPINE HYDROCHLORIDE 5 MG/HR: 0.2 INJECTION, SOLUTION INTRAVENOUS at 06:08

## 2023-01-01 RX ADMIN — DESMOPRESSIN ACETATE 2 MCG: 4 SOLUTION INTRAVENOUS at 10:08

## 2023-01-01 RX ADMIN — VASOPRESSIN 0.04 UNITS/MIN: 20 INJECTION INTRAVENOUS at 03:08

## 2023-01-01 RX ADMIN — METOPROLOL TARTRATE 5 MG: 5 INJECTION, SOLUTION INTRAVENOUS at 05:08

## 2023-01-01 RX ADMIN — NOREPINEPHRINE BITARTRATE 0.3 MCG/KG/MIN: 4 INJECTION, SOLUTION INTRAVENOUS at 08:08

## 2023-01-01 RX ADMIN — FAMOTIDINE 20 MG: 10 INJECTION, SOLUTION INTRAVENOUS at 08:08

## 2023-01-01 RX ADMIN — POTASSIUM BICARBONATE 35 MEQ: 391 TABLET, EFFERVESCENT ORAL at 08:08

## 2023-01-01 RX ADMIN — MUPIROCIN: 20 OINTMENT TOPICAL at 08:08

## 2023-01-01 RX ADMIN — SODIUM CHLORIDE 50 ML/HR: 3 INJECTION, SOLUTION INTRAVENOUS at 06:08

## 2023-01-01 RX ADMIN — SODIUM CHLORIDE 1000 ML: 9 INJECTION, SOLUTION INTRAVENOUS at 02:08

## 2023-01-01 RX ADMIN — FAMOTIDINE 20 MG: 10 INJECTION, SOLUTION INTRAVENOUS at 09:08

## 2023-01-01 RX ADMIN — INSULIN ASPART 8 UNITS: 100 INJECTION, SOLUTION INTRAVENOUS; SUBCUTANEOUS at 01:08

## 2023-01-01 RX ADMIN — VASOPRESSIN 0.04 UNITS/MIN: 20 INJECTION INTRAVENOUS at 07:08

## 2023-01-01 RX ADMIN — NOREPINEPHRINE BITARTRATE 0.06 MCG/KG/MIN: 4 INJECTION, SOLUTION INTRAVENOUS at 06:08

## 2023-01-01 RX ADMIN — CEFAZOLIN 1 G: 1 INJECTION, POWDER, FOR SOLUTION INTRAMUSCULAR; INTRAVENOUS at 12:08

## 2023-01-01 RX ADMIN — Medication 70 MG: at 05:08

## 2023-01-01 RX ADMIN — NICARDIPINE HYDROCHLORIDE 2.5 MG/HR: 0.2 INJECTION, SOLUTION INTRAVENOUS at 09:08

## 2023-01-01 RX ADMIN — SUGAMMADEX 100 MG: 100 INJECTION, SOLUTION INTRAVENOUS at 11:08

## 2023-01-01 RX ADMIN — POTASSIUM & SODIUM PHOSPHATES POWDER PACK 280-160-250 MG 2 PACKET: 280-160-250 PACK at 08:08

## 2023-01-01 RX ADMIN — PROPOFOL 15 MCG/KG/MIN: 10 INJECTION, EMULSION INTRAVENOUS at 06:08

## 2023-01-01 RX ADMIN — SODIUM CHLORIDE: 9 INJECTION, SOLUTION INTRAVENOUS at 02:08

## 2023-04-13 NOTE — PROGRESS NOTES
Subjective:       Patient ID: Sho Lennon is a 83 y.o. female.    Chief Complaint: Abdominal Pain    HPI  Review of Systems   Constitutional:  Negative for fatigue and unexpected weight change.   Respiratory:  Negative for chest tightness and shortness of breath.    Cardiovascular:  Negative for chest pain, palpitations and leg swelling.   Gastrointestinal:  Positive for abdominal pain. Negative for blood in stool, constipation, diarrhea and nausea.   Genitourinary:  Negative for dysuria, frequency and hematuria.   Musculoskeletal:  Negative for arthralgias.   Neurological:  Negative for dizziness, syncope, light-headedness and headaches.     Patient Active Problem List   Diagnosis    Thyroid nodule    Pre-diabetes    Anxiety    Subcutaneous mass    Hypothyroidism    Osteopenia    Essential hypertension    Need for vaccination with 13-polyvalent pneumococcal conjugate vaccine     Patient is here for a problem visit. PCP Dr. Eduardo  C/o burning or cramping in rlq abd with leaning forward and standing last few days    Endocrine Dr. Parmar follows hypothyroidism and thyroid nodule and osteopenia .    Objective:      Physical Exam  Vitals and nursing note reviewed.   Constitutional:       Appearance: She is well-developed.   Cardiovascular:      Rate and Rhythm: Normal rate and regular rhythm.      Heart sounds: Normal heart sounds.   Pulmonary:      Effort: Pulmonary effort is normal.      Breath sounds: Normal breath sounds.   Abdominal:      General: Abdomen is flat. Bowel sounds are normal. There is no distension.      Tenderness: There is no abdominal tenderness. There is no guarding.   Skin:     General: Skin is warm and dry.   Neurological:      Mental Status: She is alert and oriented to person, place, and time.       Assessment:       1. Pre-diabetes    2. Hypothyroidism, unspecified type    3. Essential hypertension    4. Thyroid nodule    5. Strain of abdominal muscle, initial encounter        Plan:        1. Pre-diabetes   Your blood sugar is borderline high.  This means you are at risk for developing type 2 diabetes mellitus.  To lessen your risk you should exercise regularly, avoid excess carbohydrates and work toward a body mass index of less than 25.      - Hemoglobin A1C; Future    2. Hypothyroidism, unspecified type  Stable condition.  Continue current medications.  Will adjust based on lab findings or if condition changes.  Take consistently and monitor  - TSH; Future  - T4, Free; Future  - levothyroxine (SYNTHROID) 50 MCG tablet; Take 1 po qd on Su, Tu, Th, Sa  Dispense: 60 tablet; Refill: 3  - levothyroxine (SYNTHROID) 75 MCG tablet; Take 1 po qd on M, W, F  Dispense: 36 tablet; Refill: 3    3. Essential hypertension  Controlled on current medications.  Continue current medications.    - CBC Auto Differential; Future  - Comprehensive Metabolic Panel; Future  - Lipid Panel; Future  - losartan-hydrochlorothiazide 50-12.5 mg (HYZAAR) 50-12.5 mg per tablet; Take 1 tablet by mouth once daily.  Dispense: 90 tablet; Refill: 3    4. Thyroid nodule  Screen and treat as indicated:    - US Soft Tissue Head Neck Thyroid; Future    5. Strain of abdominal muscle, initial encounter  RICE, heat, stretch, avoid restrain      Reeval 6 months or sooner prn

## 2023-08-01 PROBLEM — R40.2430 GLASGOW COMA SCALE TOTAL SCORE 3-8: Status: ACTIVE | Noted: 2023-01-01

## 2023-08-01 PROBLEM — I16.1 HYPERTENSIVE EMERGENCY: Status: ACTIVE | Noted: 2023-01-01

## 2023-08-01 PROBLEM — I60.8 ANEURYSMAL SUBARACHNOID HEMORRHAGE: Status: ACTIVE | Noted: 2023-01-01

## 2023-08-01 PROBLEM — I60.9 ACUTE SPONTANEOUS SUBARACHNOID INTRACRANIAL HEMORRHAGE DUE TO CEREBRAL ANEURYSM: Status: ACTIVE | Noted: 2023-01-01

## 2023-08-01 PROBLEM — I60.8 ANEURYSMAL SUBARACHNOID HEMORRHAGE: Status: RESOLVED | Noted: 2023-01-01 | Resolved: 2023-01-01

## 2023-08-01 PROBLEM — G93.5 BRAIN HERNIATION: Status: ACTIVE | Noted: 2023-01-01

## 2023-08-01 PROBLEM — G93.6 VASOGENIC CEREBRAL EDEMA: Status: ACTIVE | Noted: 2023-01-01

## 2023-08-01 PROBLEM — G93.5 BRAIN HERNIATION: Status: RESOLVED | Noted: 2023-01-01 | Resolved: 2023-01-01

## 2023-08-01 PROBLEM — Z99.11 ON MECHANICALLY ASSISTED VENTILATION: Status: ACTIVE | Noted: 2023-01-01

## 2023-08-01 NOTE — ASSESSMENT & PLAN NOTE
"Sho Lennon is a 83 y.o. female presenting with HH5mF4 aSAH.      Poor exam after sugammadex reversal, E1VTM3. AOx0.     Discuss goals of care with family.   Treatment plan to follow team discussion with vascular neurology and neuroICU.       --Patient was examined and all labs and diagnostics have been reviewed with staff  --A meaningful discussion was had with the entire family to discuss goals of care in the patient's present condition. It was explained that his HH score of 5 conveys a % 30 day mortality with far smaller chance of meaningful recovery (without the need of PEG/Trach and/or being able to care for herself) and that her Blair score of 4 placed her at further risk for post-treatment complications. The patient's family met privately to discuss patient's wishes and decided that shewould not have wanted intervention given such a high likelihood of having to live in a dependent state. They have opted against the possibility to NSGY intervention with ventricular drain and/or attempts to secure the aneurysm via endovascular or open vascular procedures and have elected for comfort care. All questions were answered.  --No neurosurgical intervention per family wishes  --We will sign off at this time, thank you for your consult"  "

## 2023-08-01 NOTE — PROCEDURES
Sho Lennon is a 83 y.o. female patient.    Temp: (!) 92.9 °F (33.8 °C) (08/01/23 1228)  Pulse: 61 (08/01/23 1416)  Resp: 20 (08/01/23 1047)  BP: (!) 127/57 (08/01/23 1416)  SpO2: 100 % (08/01/23 1416)  Weight: 50 kg (110 lb 3.7 oz) (Simultaneous filing. User may not have seen previous data.) (08/01/23 0945)       Central Line    Date/Time: 8/1/2023 3:21 PM    Performed by: Gian Ty MD  Authorized by: Gian Ty MD    Location procedure was performed:  [facility]UR[facility]  Consent Done ?:  Yes  Time out complete?: Verified correct patient, procedure, equipment, staff, and site/side    Indications:  Med administration and hemodynamic monitoring  Preparation:  Skin prepped with ChloraPrep  Skin prep agent dried: Skin prep agent completely dried prior to procedure    Sterile barriers: All five maximal sterile barriers used - gloves, gown, cap, mask and large sterile sheet    Hand hygiene: Hand hygiene performed immediately prior to central venous catheter insertion    Location:  Left subclavian  Catheter type:  Triple lumen  Catheter size:  7.5 Fr  Inserted Catheter Length (cm):  15  Ultrasound guidance: No    Manometry: Yes    Number of attempts:  4  Securement:  Line sutured, chlorhexidine patch, sterile dressing applied and blood return through all ports  Adverse Events:  NoneTermination Site: superior vena cava      8/1/2023

## 2023-08-01 NOTE — ED PROVIDER NOTES
Encounter Date: 2023       History     Chief Complaint   Patient presents with    [facility] Transfer     HPI  83-year-old female who presents as a transfer from an outside facility.  Found unresponsive on her bathroom floor with hypertension.  Seen at outside hospital where she was intubated for GCS 15.  Found to have a very large intracranial bleed.  She was started on nicardipine.  Intubated at around 5:00 a.m..  Has been on propofol and Cardizem with last blood pressure in the 120s for EMS.  Has not had any movement or response to pain.  They do report a bruise on the left shoulder secondary to the fall.    Review of patient's allergies indicates:   Allergen Reactions    Keflex [cephalexin]      Past Medical History:   Diagnosis Date    Kidney stones     Kidney stones     Thyroid disease     Thyroid nodule     Wears glasses      Past Surgical History:   Procedure Laterality Date    APPENDECTOMY       SECTION      x5    CYSTOSCOPY      HYSTERECTOMY      SKIN CANCER EXCISION      Mohs x4 and BCC x3-4 on face    SURGICAL REMOVAL OF MASS OF LOWER EXTREMITY Right 2019    Procedure: EXCISION, MASS, LOWER EXTREMITY  thigh;  Surgeon: Long Menendez MD;  Location: [facility];  Service: General;  Laterality: Right;    TONSILLECTOMY       Family History   Problem Relation Age of Onset    Hypertension Mother     Breast cancer Mother     Diabetes Father     Heart attack Father     Lung disease Father         mesothelioma     Throat cancer Father     Prostate cancer Brother     Other Brother         pre-diabetes    Other Brother         pre-diabetes    Breast cancer Maternal Aunt      Social History     Tobacco Use    Smoking status: Never    Smokeless tobacco: Never   Substance Use Topics    Alcohol use: Yes     Comment: Socially    Drug use: Never     Review of Systems   Unable to perform ROS: Intubated       Physical Exam     Initial Vitals   BP Pulse Resp Temp SpO2   23 0932 23 0938 23 0938  08/01/23 0938 08/01/23 0932   (!) 148/61 78 13 (!) 93.4 °F (34.1 °C) 100 %      MAP       --                Physical Exam    Constitutional:   Lying in bed, intubated, pupils dilated and fixed, no response to pain   HENT:   Head: Normocephalic.   Cardiovascular:  Normal rate, regular rhythm, normal heart sounds and intact distal pulses.           Pulmonary/Chest: Breath sounds normal. No respiratory distress. She has no wheezes. She has no rhonchi. She has no rales.   Abdominal: Abdomen is soft. She exhibits no distension.   Musculoskeletal:         General: Normal range of motion.     Neurological:   Intubated, no response to pain   Skin: Skin is warm and dry.   Bruising to left shoulder         ED Course   Procedures  Labs Reviewed   CBC W/ AUTO DIFFERENTIAL - Abnormal; Notable for the following components:       Result Value    WBC 18.33 (*)     Hematocrit 36.6 (*)     Gran # (ANC) 16.4 (*)     Immature Grans (Abs) 0.10 (*)     Lymph # 0.5 (*)     Mono # 1.2 (*)     Gran % 89.6 (*)     Lymph % 2.8 (*)     All other components within normal limits    Narrative:     ADD ON TROPONIN PER DR FARZANEH SHIELDS/ORDER# [id number] @ 10:46AM    Release to patient->Immediate   COMPREHENSIVE METABOLIC PANEL - Abnormal; Notable for the following components:    CO2 18 (*)     Glucose 252 (*)     Total Bilirubin 1.2 (*)     AST 50 (*)     All other components within normal limits    Narrative:     ADD ON TROPONIN PER DR FARZANEH SHIELDS/ORDER# [id number] @ 10:46AM    Release to patient->Immediate   LACTIC ACID, PLASMA - Abnormal; Notable for the following components:    Lactate (Lactic Acid) 2.4 (*)     All other components within normal limits    Narrative:     ADD ON TROPONIN PER DR FARZANEH PICKENSEMS/ORDER# [id number] @ 10:46AM    Release to patient->Immediate   HIV 1 / 2 ANTIBODY    Narrative:     ADD ON TROPONIN PER DR FARZANEH SHIELDS/ORDER# [id number] @ 10:46AM    Release to patient->Immediate   HEPATITIS C ANTIBODY    Narrative:      ADD ON TROPONIN PER DR FARZANEH SHIELDS/ORDER# [id number] @ 10:46AM    Release to patient->Immediate   TROPONIN I   TROPONIN I    Narrative:     ADD ON TROPONIN PER DR FARZANEH SHIELDS/ORDER# [id number] @ 10:46AM    Release to patient->Immediate     EKG Readings: (Independently Interpreted)   Initial Reading: No STEMI.   Sinus, PACs, no ST changes       Imaging Results              CT Head Without Contrast (Final result)  Result time 08/01/23 10:35:56      Final result by Maicol Melgar MD (08/01/23 10:35:56)                   Impression:      Stable extensive subarachnoid hemorrhage.  Similar prominent midline shift to the left measuring 13 mm at the level of 3rd ventricle.  Similar mild lateral ventricular dilatation with intraventricular hemorrhage.      Electronically signed by: Maicol Melgar  Date:    08/01/2023  Time:    10:35               Narrative:    EXAMINATION:  CT HEAD WITHOUT CONTRAST    CLINICAL HISTORY:  SAH;    TECHNIQUE:  Low dose axial images were obtained through the head.  Coronal and sagittal reformations were also performed. Contrast was not administered.    COMPARISON:  08/01/2023.    FINDINGS:  There is a similar appearance of extensive  subarachnoid hemorrhage most prominent extending into the right sylvian fissure. Prominent intraventricular extension is again noted.  Midline shift to the left measures 13 mm at the level of 3rd ventricle, similar to the prior study.  The basal cisterns are patent although there is some mild mass effect on the ambient cistern on the right.  Mild dilatation of the left lateral ventricle stable.    No acute territorial infarct.    Volume loss of the right maxillary sinus.                                       Medications   niCARdipine 40 mg/200 mL (0.2 mg/mL) infusion (0 mg/hr Intravenous Paused 8/1/23 1147)   propofol (DIPRIVAN) 10 mg/mL infusion (0 mcg/kg/min × 50 kg Intravenous Hold 8/1/23 1045)   mannitol 25% injection 50 g (0 g Intravenous Stopped 8/1/23  1104)   sugammadex (BRIDION) 100 mg/mL injection 100 mg (100 mg Intravenous Given 8/1/23 1124)     Medical Decision Making:   Initial Assessment:   83-year-old female who presents with EMS.  Found unresponsive, hypertensive, intubated the outside hospital for poor mental status and inability to protect airway, found to have a very large subarachnoid and intraventricular bleed with midline shift with mass effect.  Transfer for neurosurgical evaluation.  Has been on nicardipine with blood pressures less than 140.  Propofol stopped upon arrival to get a better neuro exam.  Currently she has fixed dilated pupils and no response to pain.  She is intubated with equal breath sounds bilaterally.  She had a CT chest/abdomen/pelvis at the outside hospital confirming ET tube placement.  She is comfortable on the vent so I think this is likely not dislodged.  Neurosurgery paged upon arrival.  No critical care patient as well.  We will repeat labs, EKG, troponin, lactate.  Disposition pending.             ED Course as of 08/01/23 1211 Tue Aug 01, 2023   1210 Spoke with neuro critical Care, plan to order so gamma dex, mannitol, IV fluids.  Neurosurgery has ordered repeat head CT.  Still awaiting decision on further management based on exam and imaging. [PW]      ED Course User Index  [PW] Sunni Root MD                 Clinical Impression:   Final diagnoses:  [I63.9] Stroke     Critical care time spent on the evaluation and treatment of severe organ dysfunction, review of pertinent labs and imaging studies, discussions with consulting providers and discussions with patient/family: 60 minutes.            Sunni Root MD  08/01/23 1211

## 2023-08-01 NOTE — Clinical Note
Diagnosis: Subarachnoid hemorrhage [430.ICD-9-CM]   Admitting Provider:: SRAVAN TIDWELL [[id number]]   Future Attending Provider: SRAVAN TIDWELL [[id number]]   Reason for IP Medical Treatment  (Clinical interventions that can only be accomplished in the IP setting? ) :: catastrophic neurological injury   I certify that Inpatient services for greater than or equal to 2 midnights are medically necessary:: Yes   Plans for Post-Acute care--if anticipated (pick the single best option):: [facility]

## 2023-08-01 NOTE — ED NOTES
The patient was transferred from [facility] via [facility] for ruptured aneurysm. Was found unresponsive with unknown down time. Found with vomit. No head trauma. Left shoulder bruising from fall. Initial SBP > 200 and diastolic > 100. Hypothermic with 94. Temp. GCS 6 on arrival in [place] using Etomidate and succ. Arrived with OG and Hightower catheter. Arrived on Cardene at 2.5mg/hr and Propofol infusing. Flight gave 100 mcg Fentanyl en route to er. [facility] had pt on bear hugger. Wbc 12.7. Trop 11.5. arrived with #18g left ac and #18g right bicep. C-collar in place. HOB elevated.    Referring Facility:  [facility], [facility] - Emergency Dept   Chief Complaint: Pt errived by EMS fire, stating pt was found on the ground unresponsive for an unknown amount of time. GCS of 6 upon arrival.   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

## 2023-08-01 NOTE — SUBJECTIVE & OBJECTIVE
(Not in a hospital admission)      Review of patient's allergies indicates:   Allergen Reactions    Keflex [cephalexin]        Past Medical History:   Diagnosis Date    Kidney stones     Kidney stones     Thyroid disease     Thyroid nodule     Wears glasses      Past Surgical History:   Procedure Laterality Date    APPENDECTOMY       SECTION      x5    CYSTOSCOPY      HYSTERECTOMY      SKIN CANCER EXCISION      Mohs x4 and BCC x3-4 on face    SURGICAL REMOVAL OF MASS OF LOWER EXTREMITY Right 2019    Procedure: EXCISION, MASS, LOWER EXTREMITY  thigh;  Surgeon: Long Menendez MD;  Location: [facility];  Service: General;  Laterality: Right;    TONSILLECTOMY       Family History       Problem Relation (Age of Onset)    Breast cancer Mother, Maternal Aunt    Diabetes Father    Heart attack Father    Hypertension Mother    Lung disease Father    Other Brother, Brother    Prostate cancer Brother    Throat cancer Father          Tobacco Use    Smoking status: Never    Smokeless tobacco: Never   Substance and Sexual Activity    Alcohol use: Yes     Comment: Socially    Drug use: Never    Sexual activity: Not Currently     Partners: Male     Review of Systems  Objective:     Weight: 50 kg (110 lb 3.7 oz) (Simultaneous filing. User may not have seen previous data.)  Body mass index is 22.26 kg/m².  Vital Signs (Most Recent):  Temp: (!) 92.9 °F (33.8 °C) (23 1228)  Pulse: (!) 54 (23 1247)  Resp: 20 (23 1047)  BP: (!) 115/58 (23 1247)  SpO2: 100 % (23 1247) Vital Signs (24h Range):  Temp:  [92.9 °F (33.8 °C)-94.1 °F (34.5 °C)] 92.9 °F (33.8 °C)  Pulse:  [] 54  Resp:  [13-28] 20  SpO2:  [99 %-100 %] 100 %  BP: ()/() 115/58                Vent Mode: A/C  Oxygen Concentration (%):  [30-50] 30  Resp Rate Total:  [14 br/min-26 br/min] 14 br/min  Vt Set:  [400 mL] 400 mL  PEEP/CPAP:  [5 cmH20] 5 cmH20  Pressure Support:  [0 cmH20] 0 cmH20  Mean Airway Pressure:  [7.5  cmH20-10 cmH20] 7.5 cmH20             NG/OG Tube 08/01/23 0959 Center mouth (Active)            Urethral Catheter 08/01/23 0600 (Active)          Physical Exam     E1VTM3.   Pupils fixed, dilated, 7mm.   No corneal. +cough.   RUE no movement, LUE abdnormal posturing, LLE WD, RLE extensor  +blackwell  +Bruising on L shoulder    Neurosurgery Physical Exam    Significant Labs:  Recent Labs   Lab 08/01/23  0622 08/01/23  1032   * 252*    137   K 3.2* 3.5    105   CO2 21* 18*   BUN 21 18   CREATININE 0.6 0.7   CALCIUM 9.0 9.2     Recent Labs   Lab 08/01/23  0604 08/01/23  0622 08/01/23  1032   WBC  --  12.79* 18.33*   HGB  --  13.0 12.3   HCT 39 38.8 36.6*   PLT  --  185 219     Recent Labs   Lab 08/01/23  0622   INR 1.0   APTT 23.8     Microbiology Results (last 7 days)       ** No results found for the last 168 hours. **          Recent Lab Results  (Last 5 results in the past 24 hours)        08/01/23  1032   08/01/23  0630   08/01/23  0629   08/01/23  0622   08/01/23  0615        Benzodiazepines     Negative           Phencyclidine     Negative           Albumin 3.8       4.4         Alcohol, Serum       <5         Alkaline Phosphatase 60       64         ALT 28       32         Amphetamine Screen, Ur     Negative           Anion Gap 14       11         Appearance, UA   Clear             aPTT       23.8  Comment: Refer to local heparin nomogram for intensity/dose specific   therapeutic   range.           AST 50  Comment: *Result may be interfered by visible hemolysis       45         Bacteria, UA   Negative             Barbiturate Screen, Ur     Negative           Baso # 0.05       0.03         Basophil % 0.3       0.2         Bilirubin (UA)   Negative             BILIRUBIN TOTAL 1.2  Comment: For infants and newborns, interpretation of results should be based  on gestational age, weight and in agreement with clinical  observations.    Premature Infant recommended reference ranges:  Up to 24  hours.............<8.0 mg/dL  Up to 48 hours............<12.0 mg/dL  3-5 days..................<15.0 mg/dL  6-29 days.................<15.0 mg/dL         0.9  Comment: For infants and newborns, interpretation of results should be based  on gestational age, weight and in agreement with clinical  observations.    Premature Infant recommended reference ranges:  Up to 24 hours.............<8.0 mg/dL  Up to 48 hours............<12.0 mg/dL  3-5 days..................<15.0 mg/dL  6-29 days.................<15.0 mg/dL           BUN 18       21         Calcium 9.2       9.0         Chloride 105       104         CO2 18       21         Cocaine (Metab.)     Negative           Color, UA   Colorless             Creatinine 0.7       0.6         Creatinine, Urine     11.0  Comment: The random urine reference ranges provided were established   for 24 hour urine collections.  No reference ranges exist for  random urine specimens.  Correlate clinically.             Differential Method Automated       Automated         eGFR >60.0       >60.0         Eos # 0.0       0.0         Eosinophil % 0.1       0.2         Glucose 252       240         Glucose, UA   3+             Gran # (ANC) 16.4       11.3         Gran % 89.6       88.2         Hematocrit 36.6       38.8         Hemoglobin 12.3       13.0         Hepatitis C Ab Non-reactive               HIV 1/2 Ag/Ab Non-reactive               Hyaline Casts, UA   1             Immature Grans (Abs) 0.10  Comment: Mild elevation in immature granulocytes is non specific and   can be seen in a variety of conditions including stress response,   acute inflammation, trauma and pregnancy. Correlation with other   laboratory and clinical findings is essential.         0.08  Comment: Mild elevation in immature granulocytes is non specific and   can be seen in a variety of conditions including stress response,   acute inflammation, trauma and pregnancy. Correlation with other   laboratory and clinical  findings is essential.           Immature Granulocytes 0.5       0.6         INR       1.0  Comment: Coumadin Therapy:  2.0 - 3.0 for INR for all indicators except mechanical heart valves  and antiphospholipid syndromes which should use 2.5 - 3.5.           Ketones, UA   2+             Lactate, Asif 2.4  Comment: Falsely low lactic acid results can be found in samples   containing >=13.0 mg/dL total bilirubin and/or >=3.5 mg/dL   direct bilirubin.         2.4  Comment: Falsely low lactic acid results can be found in samples   containing >=13.0 mg/dL total bilirubin and/or >=3.5 mg/dL   direct bilirubin.  LA critical result(s) called and verbal readback obtained from Sally Love RN by HS3 08/01/2023 08:01           Leukocytes, UA   Negative             Lymph # 0.5       0.7         Lymph % 2.8       5.2         MCH 29.6       29.9         MCHC 33.6       33.5         MCV 88       89         Microscopic Comment   SEE COMMENT  Comment: Other formed elements not mentioned in the report are not   present in the microscopic examination.                Mono # 1.2       0.7         Mono % 6.7       5.6         MPV 10.9       10.8         NITRITE UA   Negative             nRBC 0       0         Occult Blood UA   Trace             Opiate Scrn, Ur     Negative           pH, UA   8.0             Platelets 219  Comment: occasional Plt clumps present.        185         POC Creatinine         0.6       Potassium 3.5  Comment: *Result may be interfered by visible hemolysis       3.2         PROTEIN TOTAL 6.9  Comment: *Result may be interfered by visible hemolysis       7.6         Protein, UA   1+  Comment: Recommend a 24 hour urine protein or a urine   protein/creatinine ratio if globulin induced proteinuria is  clinically suspected.               Protime       10.8         RBC 4.15       4.35         RBC, UA   1             RDW 13.7       13.5         Sample         VENOUS       Sodium 137       136         Specific Gravity,  UA   1.010             Specimen UA   Urine, Clean Catch             Squam Epithel, UA   0             Marijuana (THC) Metabolite     Negative           Toxicology Information     SEE COMMENT  Comment: This screen includes the following classes of drugs at the   listed cut-off:    Benzodiazepines                  200 ng/ml  Cocaine metabolite               300 ng/ml  Opiates                          300 ng/ml  Barbiturates                     200 ng/ml  Amphetamines                    1000 ng/ml  Marijuana metabs (THC)            50 ng/ml  Phencyclidine (PCP)               25 ng/ml    High concentrations of Methylenedioxymethamphetamine (MDMA aka  Ectasy) and other structurally similar compounds may cross-   react with the Amphetamine/Methamphetamine screening   immunoassay giving a false positive result.    Note: This exception list includes only more common   interferants in toxicology screen testing.  Because of many   cross-reactantspositive results on toxicology drug screens   should be confirmed whenever results do not correlate with   clinical presentation.    This report is intended for use in clinical monitoring and  management of patients. It is not intended for use in   employment related drug testing.    Because of any cross-reactants, positive results on toxicology  drug screens should be confirmed whenever results do not  correlate with clinical presentation.    Presumptive positive results are unconfirmed and may be used   only for medical purposes.             Troponin I 0.026  Comment: The reference interval for Troponin I represents the 99th percentile   cutoff   for our facility and is consistent with 3rd generation assay   performance.                 Troponin I High Sensitivity       11.5  Comment: Troponin results differ between methods. Do not use   results between Troponin methods interchangeably.    Alkaline Phospatase levels above 400 U/L may   cause false positive results.    Access hsTnI  should not be used for patients taking   Asfotase jada (Strensiq).           UROBILINOGEN UA   Negative             WBC, UA   0             WBC 18.33       12.79         Yeast, UA   None                                    Significant Diagnostics:  CT: CT Head Without Contrast    Result Date: 8/1/2023  Stable extensive subarachnoid hemorrhage.  Similar prominent midline shift to the left measuring 13 mm at the level of 3rd ventricle.  Similar mild lateral ventricular dilatation with intraventricular hemorrhage. Electronically signed by: Maicol Melgar Date:    08/01/2023 Time:    10:35   MRI: No results found in the last 24 hours.

## 2023-08-01 NOTE — ASSESSMENT & PLAN NOTE
On mechanically assisted ventilation  Nicolas coma scale total score 3-8  Hypertensive emergency    Found unresponsive the morning of 8/1. Intubated 8/1/2023 d/t GCS 3, initially on propofol which was discontinued later that morning. Patient hypertensive with SBP in 200s, transferred on Cardene gtt. CTH: extensive SAH with prominent 13 mm left midline shift. 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 GOC discussion held with family, who decided to pursue treatment at this time. S/p mannitol 50 g IV x 1 & Bridion 100 mg IV x 1.    - NSGY following, patient will undergo right craniectomy with EVD placement today  - Continue Cardene gtt to maintain SBP < 140 (hold home Hyzaar as patient is currently intubated)  - 3% hypertonic saline starting at 50 cc/hr; titrate to 145-155 (CMP q6h to monitor)  - q1h neuro checks to monitor for improvement  - If improvement, consider angio to assess for possible embolization

## 2023-08-01 NOTE — CARE UPDATE
08/01/23 0605        Airway - Non-Surgical 08/01/23 0555 Endotracheal Tube   Placement Date/Time: 08/01/23 0555   Method of Intubation: Glidescope  Inserted by: MD  Airway Device: Endotracheal Tube  Mask Ventilation: Easy  Blade: Glidescope #3  Airway Device Size: 7.0  Style: Cuffed  Cuff Inflation: Minimal occlusive pressure ...   Secured at (S)  22 cm   Measured At Teeth     Moved from 24 to 22 at teeth per MD Andrzej patel at Bedside.

## 2023-08-01 NOTE — ED NOTES
Bed: 02  Expected date: 8/1/23  Expected time: 8:40 AM  Means of arrival:   Comments:  [facility] transfer

## 2023-08-01 NOTE — ED PROVIDER NOTES
Encounter Date: 2023       History     Chief Complaint   Patient presents with    Unresponsive     Pt errived by EMS fire, stating pt was found on the ground unresponsive for an unknown amount of time. GCS of 6 upon arrival.      HPI    Sho Herron  is an 83 year old female found on the bathroom floor unresponsive. GCS of 6 per EMS on arrival GCS 3 here.  EMS reported very elevated blood pressure in the 200s blood pressure on arrival here was 233/105 Pt did not have a gag reflex and subsequently had OPA placed in field.  Had a non-rebreather in place on arrival.  Sats are 100% respirations were 28 pulse 99 with signs of trauma to the left shoulder and unequal pupils    Review of patient's allergies indicates:   Allergen Reactions    Keflex [cephalexin]      Past Medical History:   Diagnosis Date    Kidney stones     Kidney stones     Thyroid disease     Thyroid nodule     Wears glasses      Past Surgical History:   Procedure Laterality Date    APPENDECTOMY       SECTION      x5    CYSTOSCOPY      HYSTERECTOMY      SKIN CANCER EXCISION      Mohs x4 and BCC x3-4 on face    SURGICAL REMOVAL OF MASS OF LOWER EXTREMITY Right 2019    Procedure: EXCISION, MASS, LOWER EXTREMITY  thigh;  Surgeon: Long Menendez MD;  Location: formerly [facility];  Service: General;  Laterality: Right;    TONSILLECTOMY       Family History   Problem Relation Age of Onset    Hypertension Mother     Breast cancer Mother     Diabetes Father     Heart attack Father     Lung disease Father         mesothelioma     Throat cancer Father     Prostate cancer Brother     Other Brother         pre-diabetes    Other Brother         pre-diabetes    Breast cancer Maternal Aunt      Social History     Tobacco Use    Smoking status: Never    Smokeless tobacco: Never   Substance Use Topics    Alcohol use: Yes     Comment: Socially    Drug use: Never     Review of Systems   Unable to perform ROS: Acuity of condition       Physical Exam     Initial  Vitals   BP Pulse Resp Temp SpO2   08/01/23 0550 08/01/23 0547 08/01/23 0547 08/01/23 0609 08/01/23 0547   (!) 145/102 93 (!) 25 (!) 94.1 °F (34.5 °C) 100 %      MAP       --                Physical Exam    Constitutional: Airway: Normal. Circulation: Normal. Pulses:Radial palpable.   Pt taking shallow breaths   Eyes: Pupils: Abnormal pupils.   Right 4mm, L 2mm   Neck:   Placed in c collar   Normal range of motion.  Cardiovascular:  Normal rate and normal heart sounds.           Pulmonary/Chest: No stridor. She has no wheezes.   Abdominal: Abdomen is soft.   Musculoskeletal:         General: Normal range of motion.      Cervical back: Normal range of motion.      Comments: Bruising to the left shoulder     Neurological: She is unresponsive.   GCS 3   Skin: Skin is warm and dry.   Bruising in various stages         ED Course   Intubation    Date/Time: 8/1/2023 6:32 AM  Location procedure was performed: [facility] EMERGENCY DEPARTMENT    Performed by: Megan Cerrato DO  Authorized by: Farzaneh Donnelly MD  Consent Done: Emergent Situation  Indications: airway protection  Intubation method: video-assisted  Patient status: paralyzed (RSI)  Preoxygenation: BVM  Sedatives: etomidate  Paralytic: succinylcholine  Laryngoscope size: Glide 3  Tube size: 7.0 mm  Tube type: cuffed  Number of attempts: 2  Ventilation between attempts: BVM  Cricoid pressure: no  Cords visualized: yes  Post-procedure assessment: ETCO2 monitor  Breath sounds: clear  Cuff inflated: yes  ETT to teeth: 22 cm  Tube secured with: adhesive tape and ETT burleson  Chest x-ray interpreted by me.  Chest x-ray findings: endotracheal tube in appropriate position  Patient tolerance: Patient tolerated the procedure well with no immediate complications  Complications: No  Specimens: No  Implants: No        Labs Reviewed   CBC W/ AUTO DIFFERENTIAL - Abnormal; Notable for the following components:       Result Value    WBC 12.79 (*)     Immature Granulocytes 0.6 (*)      Gran # (ANC) 11.3 (*)     Immature Grans (Abs) 0.08 (*)     Lymph # 0.7 (*)     Gran % 88.2 (*)     Lymph % 5.2 (*)     All other components within normal limits   COMPREHENSIVE METABOLIC PANEL - Abnormal; Notable for the following components:    Potassium 3.2 (*)     CO2 21 (*)     Glucose 240 (*)     All other components within normal limits   URINALYSIS, REFLEX TO URINE CULTURE - Abnormal; Notable for the following components:    Color, UA Colorless (*)     Protein, UA 1+ (*)     Glucose, UA 3+ (*)     Ketones, UA 2+ (*)     Occult Blood UA Trace (*)     All other components within normal limits    Narrative:     Specimen Source->Urine   POCT GLUCOSE - Abnormal; Notable for the following components:    POC Glucose 225 (*)     All other components within normal limits   ISTAT PROCEDURE - Abnormal; Notable for the following components:    POC PO2 593 (*)     POC Glucose 253 (*)     POC Potassium 3.1 (*)     All other components within normal limits   PROTIME-INR   APTT   URINALYSIS MICROSCOPIC    Narrative:     Specimen Source->Urine   LACTIC ACID, PLASMA   DRUG SCREEN PANEL, URINE EMERGENCY   ALCOHOL,MEDICAL (ETHANOL)   TROPONIN I HIGH SENSITIVITY   TYPE & SCREEN   ISTAT CREATININE     EKG Readings: (Independently Interpreted)   Initial Reading: No STEMI. Rhythm: Normal Sinus Rhythm. Heart Rate: 87. Ectopy: No Ectopy. Conduction: Normal.     ECG Results              EKG 12-lead (In process)  Result time 08/01/23 06:19:31      In process by Interface, Lab In [facility] (08/01/23 06:19:31)                   Narrative:    Test Reason : R41.82,    Vent. Rate : 087 BPM     Atrial Rate : 087 BPM     P-R Int : 178 ms          QRS Dur : 082 ms      QT Int : 406 ms       P-R-T Axes : 065 006 025 degrees     QTc Int : 488 ms    Sinus rhythm with Premature atrial complexes  Otherwise normal ECG  When compared with ECG of 17-MAY-2019 13:30,  Premature atrial complexes are now Present  QT has lengthened    Referred By:  AAAREFERR   SELF           Confirmed By:                                   Imaging Results              CT Chest Abdomen Pelvis With Contrast (xpd) (Final result)  Result time 08/01/23 07:33:37      Final result by Chilo Adkins MD (08/01/23 07:33:37)                   Narrative:    CMS MANDATED QUALITY DATA - CT RADIATION - 436    All CT scans at this facility utilize dose modulation, iterative reconstruction, and/or weight based dosing when appropriate to reduce radiation dose to as low as reasonably achievable.        Reason: Trauma    TECHNIQUE: CT thorax, abdomen, and pelvis with 100 mL Omnipaque 350.    COMPARISON: None    CT THORAX:  Endotracheal tube tip terminates proximal to matilde. Enteric catheter tip terminates in stomach.    Dependent subpleural lower lobe patchy opacities suggest atelectasis right lower lobe 9 mm nodule is noted (image 84) with central low-density suggested.    Coronary artery calcifications are present. Thoracic aorta is of normal caliber. No pleural or pericardial effusion. No enlarged hilar or mediastinal lymph nodes.    Convex right lower thoracic spine curvature is evident. Degenerative changes affect the spine. No acute osseous abnormality    CT ABDOMEN:  Liver, gallbladder, pancreas, spleen, and adrenals are normal. Nonobstructing right renal calculus is present. Bilateral renal sinus cysts are suggested. Kidneys are otherwise unremarkable minor aortoiliac calcifications are present.    No intestinal abnormality identified. Appendix is not identified but no secondary signs of appendicitis. No free intraperitoneal gas.    Convex left lumbar spine curvature is present with multilevel advanced disc degeneration and facet joint osteoarthrosis. Posterior paraspinous muscle fatty atrophy occurs bilaterally.    CT PELVIS:  Hightower catheter terminates in bladder with bladder otherwise unremarkable. Uterus has been removed. No adnexal mass or free pelvic fluid. No acute osseous  abnormality. Degenerative changes affect the pubic symphysis.    IMPRESSION:    1. Dependent subpleural lower lobe opacity suggesting atelectasis.  2. 9 mm right lower lobe pulmonary nodule, with central low-density. This could represent a hamartoma or focal mucous plugging of a bronchus. CT thorax without IV contrast follow-up can be considered in 3-6 months to document stability, if needed.  3. Nonobstructing right renal calculus.    Electronically signed by:  Chilo Adkins MD  8/1/2023 7:33 AM CDT Workstation: [id number]                                     CTA Head and Neck (xpd) (Final result)  Result time 08/01/23 07:26:18      Final result by Chilo Adkins MD (08/01/23 07:26:18)                   Narrative:    CMS MANDATED QUALITY DATA - CT RADIATION - 436    All CT scans at this facility utilize dose modulation, iterative reconstruction, and/or weight based dosing when appropriate to reduce radiation dose to as low as reasonably achievable.    CMS MANDATED QUALITY DATA - CAROTID - 195    All measurements and percent stenosis described below were determined using NASCET criteria or criteria similar to NASCET, as defined by the Society of Radiologists in Ultrasound Consensus Conference, Radiology, 2003        Reason: Stroke/TIA, determine embolic source    Technique: CT angiography of brain and neck with 100 mL Omnipaque 350.  Maximum intensity projection coronal reformations were obtained at a separate workstation and stored in the patient's permanent medical record.    COMPARISON: None    CTA BRAIN:  VASCULAR FINDINGS:  Mild peripheral atherosclerotic plaque affects cavernous and supraclinoid segments of bilateral internal carotid arteries. 50% stenosis of supraclinoid segment of right internal carotid artery is suggested.    Closely associated with the supraclinoid right ICA and extending posteriorly, an expected location of the right posterior communicating artery, is amorphous collection of contrast  measuring 10 x 9 mm, suspicious for a ruptured aneurysm.    Bilateral posterior communicating arteries remain patent but diminutive in size. The right posterior communicating artery is not confidently identified on this exam. Intracranial vertebral arteries and basilar artery are patent.    Bilateral middle cerebral arteries are patent, as are the bilateral anterior cerebral arteries. Branches of middle cerebral arteries appear diffusely diminutive in size.    NONVASCULAR FINDINGS:  Please see head CT report for extensive abnormalities.      CTA NECK:  VASCULAR FINDINGS:  Conventional 3 branch vessel aortic arch anatomy is present.    Mild partially calcified atherosclerotic plaque in left carotid bulb extends into origins of left internal and external carotid arteries without significant narrowing. Left vertebral artery is patent.    Right brachiocephalic and common carotid arteries are patent. Moderate partially calcified atherosclerotic plaque in right carotid bulb extends into origins of right internal carotid artery, resulting in 30% stenosis of the right carotid bulb and less than 10% stenosis of right ICA origin. Right ECA and branches opacify. Right vertebral artery is patent.    NONVASCULAR FINDINGS:  Endotracheal tube and orogastric tube are partially visualized. Visualized lung apices are clear. Cervical soft tissues are unremarkable.    IMPRESSION:    1. 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 The appearance suggest active bleeding.  2. Bilateral intracranial ICA atherosclerotic plaque including 50% stenosis of supraclinoid segment of right ICA.  3. Suggested occlusion of right posterior communicating artery which is not identified on this exam.  4. Diffuse diminutive size of MCA branches and posterior communicating arteries perhaps due to increased intracranial  pressure/vasospasm.  5. Mild atherosclerotic plaque in bilateral carotid bulbs and origins of ICAs bilaterally.  6. Per extension intracranial abnormalities please see head CT report.            RESULT NOTIFICATION: These observations were discussed by Dr. Adkins with, and acknowledged by, Dr. PHILLIP at 8/1/2023 7:26 AM CDT    Electronically signed by:  Chilo Adkins MD  8/1/2023 7:26 AM CDT Workstation: [id number]                                     CT Cervical Spine Without Contrast (Final result)  Result time 08/01/23 07:02:57      Final result by Chilo Adkins MD (08/01/23 07:02:57)                   Narrative:    CMS MANDATED QUALITY DATA - CT RADIATION - 436    All CT scans at this facility utilize dose modulation, iterative reconstruction, and/or weight based dosing when appropriate to reduce radiation dose to as low as reasonably achievable.        Reason: Trauma    TECHNIQUE: Cervical spine CT without IV contrast obtained with coronal and sagittal reformations.    COMPARISON: None    FINDINGS:  Negative for fracture. There is suggestion of hemorrhage within the thecal sac throughout the cervical spine, with extensive subarachnoid and intraventricular hemorrhage partially visualized in the posterior cranial fossa.    Carotid artery calcifications are present bilaterally. Partially visualized endotracheal tube and orogastric tubes are noted. Visualized lung apices are clear.    Severe C2-C3 facet joint osteoarthrosis allows 2 mm anterolisthesis of C2 on C3.  Mature osseous fusion affects the disc and facet joints at C3-C4  At C4-C5, severe disc degeneration of left facet joint osteoarthrosis causes mild left neural foramen.  At C5-C6, severe disc degeneration and 3 mm retrolisthesis of C5 on C6 causes no significant narrowing.  At C6-C7, severe disc degeneration and left uncovertebral joint osteophyte cause mild left neural foramen narrowing.  At C7-T1, moderate left facet joint osteoarthrosis.    Coronal  and sagittal reformations confirm findings as discussed above. No abnormal facet widening.    IMPRESSION:    1. No acute cervical spine fracture.  2. Multilevel cervical spine degenerative changes.  3. Continuation of subarachnoid and intraventricular hemorrhage partially visualized in the posterior cranial fossa into the thecal sac of the central canal throughout the cervical spine.    Electronically signed by:  Chilo Adkins MD  8/1/2023 7:02 AM CDT Workstation: [id number]                                     CT Maxillofacial Without Contrast (Final result)  Result time 08/01/23 07:07:28      Final result by Chilo Adkins MD (08/01/23 07:07:28)                   Narrative:    CMS MANDATED QUALITY DATA - CT RADIATION - 436    All CT scans at this facility utilize dose modulation, iterative reconstruction, and/or weight based dosing when appropriate to reduce radiation dose to as low as reasonably achievable.          Reason: Facial trauma    TECHNIQUE: Maxillofacial CT without IV contrast obtained with Coronal and sagittal reformations.    COMPARISON: None    FINDINGS:  Negative for facial fracture. The mandible remains intact.    Mild mucosal thickening diffusely affects bilateral ethmoid and right maxillary sinuses. 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 Mastoids and middle ears are clear.    Bilateral orbital soft tissues are unremarkable. Extensive acute intracranial hemorrhage is partially visualized and discussed in detail on same-day head CT report along with right to left midline shift. Enteric catheter and orogastric tube are partially visualized. Carotid artery calcifications noted.    Coronal and sagittal reformations confirm no depressed orbital floor fracture. Cervical spine degenerative changes partially visualized.    IMPRESSION:  Negative for facial fracture.    Electronically signed by:  Chilo Adkins MD  8/1/2023 7:07 AM CDT Workstation: [id number]                                     CT Head  Without Contrast (Final result)  Result time 08/01/23 07:01:54      Final result by Kendall Truong MD (08/01/23 07:01:54)                   Narrative:    CT brain and skull without contrast, 8/1/2023 6:40 AM CDT    HISTORY: Trauma.    TECHNIQUE: Axial imaging of the brain from skull vertex to upper neck is obtained without IV contrast administration with evaluation of soft tissue and bone window images as well as reconstructed coronal and sagittal images.    Dose Lowering Techniques:  All CT scans at this facility utilize dose modulation, iterative reconstruction, and/or weight based dosing when appropriate to reduce radiation dose to as low as reasonably achievable.    COMPARISON: No prior exams available for comparison    FINDINGS:    VENTRICLES:There is intraventricular hemorrhage and nearly 2 cm of midline shift, right lateral ventricle shifted to the left.    GRAY/WHITE MATTER: Normal attenuation with no lesions identified    INTRA-AXIAL STRUCTURES:There is a large intraparenchymal hematoma occupying the right basal ganglia area dimensions of at least 4.4 x 5.4 x 7.9 cm. This hemorrhage has intraventricular and subarachnoid extension of the large volume of blood noted throughout the basilar subarachnoid space and the right temporal fossa region. There is some interhemispheric subarachnoid blood as well.    SULCI:Generalized mass effect upon right-sided cortical sulci evident.    ORBITS:The globes and intraorbital structures have a normal CT appearance.    SCALP: There is a scalp area of swelling left parietal region    SINUSES:The sinuses and mastoids are normally aerated and are clear.    BONY STRUCTURES: There is no evidence of fracture or other bony lesion.    IMPRESSION:    1.  Extensive intracranial hemorrhage. Probable primary abnormality is a large basal ganglia bleed with intraventricular and subarachnoid rupture  2.  Considerable right-sided mass effect with right-to-left midline shift.  3.  Small  left subgaleal mid parietal scalp hematoma  4.  Findings called to Dr. Maria 7:00 AM, 8/1/2023      Electronically signed by:  Kendall Truong MD  8/1/2023 7:01 AM CDT Workstation: [id number]                                     X-Ray Chest AP Portable (In process)  Result time 08/01/23 06:02:41                  X-Rays:   Independently Interpreted Readings:   Chest X-Ray: Normal heart size.  No infiltrates.  No acute abnormalities. ET tube in appropriate position     Medications   etomidate injection (20 mg Intravenous Given 8/1/23 0548)   succinylcholine injection (70 mg Intravenous Given 8/1/23 0548)   niCARdipine 40 mg/200 mL (0.2 mg/mL) infusion (2.5 mg/hr Intravenous Rate/Dose Change 8/1/23 0708)   propofol (DIPRIVAN) 10 mg/mL infusion (15 mcg/kg/min × 49.8 kg Intravenous New Bag 8/1/23 0647)   0.9%  NaCl infusion (has no administration in time range)   iohexoL (OMNIPAQUE 350) injection 100 mL (100 mLs Intravenous Given 8/1/23 0650)     Medical Decision Making:   Independently Interpreted Test(s):   I have ordered and independently interpreted X-rays - see prior notes.  I have ordered and independently interpreted EKG Reading(s) - see prior notes  Clinical Tests:   Lab Tests: Ordered and Reviewed       <> Summary of Lab: Glucose 225  Point of care creatinine 0.6  VBG with lytes pH 7.43 pCO2 of 36.6 PO2 593 bicarb 24 glucose 253 sodium 136 potassium 3.1 calcium 1.18 hematocrit 39    Normal coags  Potassium 3.2 bicarb 21 glucose 240  White blood cell count 12.79 ANC 11.3  Cath urine colorless 3+ glucose 2+ ketones trace blood otherwise normal  Radiological Study: Ordered and Reviewed  Medical Tests: Ordered and Reviewed  ED Management:  Sho Herron  is an 83 year old female found on the bathroom floor unresponsive. GCS of 6 per EMS on arrival GCS 3 here.  EMS reported very elevated blood pressure in the 200s blood pressure on arrival here was 233/105 Pt did not have a gag reflex and subsequently had OPA  placed in field.  Had a non-rebreather in place on arrival.  Sats are 100% respirations were 28 pulse 99 with signs of trauma to the left shoulder and unequal pupils  On physical exam patient had the above vital signs she was intubated after bag-valve mask ventilation due to GCS of 3.  She did move her left arm minimally without purposeful movement she was sedated using propofol blood pressure remained in the 200s she was placed on a nicardipine drip and blood pressure improved rapidly at 5 milligrams/hour she was decreased to 2.5 milligrams/hour goal systolic blood pressures less than 180 with a map above 65.  Patient had a rectal temperature 94.1° high Irene Hugger was placed on the patient and she will be infused normal saline using the fluid warmer 75 mL an hour  Head of bed is at 30°  MDM    Patient presents for emergent evaluation of acute altered mental status fall in bathroom GCS 6 GCS 3 on arrival that poses a possible threat to life and/or bodily function.    Differential diagnosis includes but is not limited to intracranial bleeding stroke intracranial injury due to fall skull fracture cervical fracture intrathoracic or abdominal injury ACS PE.  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  Labs significant for see above  I ordered X-rays and personally reviewed them and reviewed the radiologist interpretation.  Xray significant for see above.    I ordered EKG and personally reviewed it.  EKG significant for see above.    I ordered CT scan and personally reviewed it and reviewed the radiologist interpretation.  CT maxillofacial bonesIMPRESSION:   Negative for facial  fracture.   CT cervical-spine    CMS MANDATED QUALITY DATA - CT RADIATION - 436     All CT scans at this facility utilize dose modulation, iterative reconstruction, and/or weight based dosing when appropriate to reduce radiation dose to as low as reasonably achievable.         Reason: Trauma     TECHNIQUE: Cervical spine CT without IV contrast obtained with coronal and sagittal reformations.     COMPARISON: None     FINDINGS:   Negative for fracture. There is suggestion of hemorrhage within the thecal sac throughout the cervical spine, with extensive subarachnoid and intraventricular hemorrhage partially visualized in the posterior cranial fossa.     Carotid artery calcifications are present bilaterally. Partially visualized endotracheal tube and orogastric tubes are noted. Visualized lung apices are clear.     Severe C2-C3 facet joint osteoarthrosis allows 2 mm anterolisthesis of C2 on C3.   Mature osseous fusion affects the disc and facet joints at C3-C4   At C4-C5, severe disc degeneration of left facet joint osteoarthrosis causes mild left neural foramen.   At C5-C6, severe disc degeneration and 3 mm retrolisthesis of C5 on C6 causes no significant narrowing.   At C6-C7, severe disc degeneration and left uncovertebral joint osteophyte cause mild left neural foramen narrowing.   At C7-T1, moderate left facet joint osteoarthrosis.     Coronal and sagittal reformations confirm findings as discussed above. No abnormal facet widening.     IMPRESSION:     1. No acute cervical spine fracture.   2. Multilevel cervical spine degenerative changes.   3. Continuation of subarachnoid and intraventricular hemorrhage partially visualized in the posterior cranial fossa into the thecal sac of the central canal throughout the cervical spine.         CT head without contrast significant for IMPRESSION:     1.  Extensive intracranial hemorrhage. Probable primary abnormality is a large basal ganglia bleed with intraventricular and  subarachnoid rupture   2.  Considerable right-sided mass effect with right-to-left midline shift.   3.  Small left subgaleal mid parietal scalp hematoma   4.  Findings called to Dr. Maria 7:00 AM, 8/1/2023   Cta head and neck   IMPRESSION:     1. 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 The appearance suggest active bleeding.   2. Bilateral intracranial ICA atherosclerotic plaque including 50% stenosis of supraclinoid segment of right ICA.   3. Suggested occlusion of right posterior communicating artery which is not identified on this exam.   4. Diffuse diminutive size of MCA branches and posterior communicating arteries perhaps due to increased intracranial pressure/vasospasm.   5. Mild atherosclerotic plaque in bilateral carotid bulbs and origins of ICAs bilaterally.   6. Per extension intracranial abnormalities please see head CT report.  -------------------------------   CT chest abd pelvis - IMPRESSION:     1. Dependent subpleural lower lobe opacity suggesting atelectasis.   2. 9 mm right lower lobe pulmonary nodule, with central low-density. This could represent a hamartoma or focal mucous plugging of a bronchus. CT thorax without IV contrast follow-up can be considered in 3-6 months to document stability, if needed.   3. Nonobstructing right renal calculus.   -----------------  Admission MDM  I discussed the patient presentation labs, ekg, X-rays, CT findings with the neurosurgeon Dr. Sawyer  Patient was managed in the ED with IV succinylcholine 70 mg and etomidate 20 mg for RSI, patient is now sedated with propofol and has a nicardipine infusion for blood pressure management due to hypertensive emergency with intracranial bleeding   The response to treatment was critical.    Patient required emergent consultation to Neurosurgery they have determined patient needs to be  transported to [facility] we are working on transport hopefully by helicopter for Neurosurgery critical care due to ruptured intracranial aneurysm  Farzaneh Donnelly M.D.       Other:   I discussed test(s) with the performing physician.       <> Summary of the Findings: Dr. Truong and I discussed CT head and CT maxillofacial results    I discussed with Dr. Adkins CTA head results  I have discussed this case with another health care provider.       <> Summary of the Discussion: Dr Sawyer - neurosurgery has reviewed the CT scans and the CTA and requested patient be transferred to [facility] due to ruptured intracranial aneurysm            Attending Attestation:         Attending Critical Care:   Critical Care Times:   Direct Patient Care (initial evaluation, reassessments, and time considering the case)................................................................30 minutes.   Additional History from reviewing old medical records or taking additional history from the family, EMS, PCP, etc.......................10 minutes.   Ordering, Reviewing, and Interpreting Diagnostic Studies...............................................................................................................10 minutes.   Documentation..................................................................................................................................................................................10 minutes.   Consultation with other Physicians. .................................................................................................................................................20 minutes.   Consultation with the patient's family directly relating to the patient's condition, care, and DNR status (when patient unable)......20 minutes.   ==============================================================  Total Critical Care Time - exclusive of procedural time: 100  minutes.  ==============================================================  Critical care was necessary to treat or prevent imminent or life-threatening deterioration of the following conditions: airway compromise, CNS failure, stroke, trauma and hypertensive urgency.   The following critical care procedures were done by me (see procedure notes): airway management and endotracheal tube placement *.   Critical care was time spent personally by me on the following activities: obtaining history from patient or relative, examination of patient, review of old charts, ordering lab, x-rays, and/or EKG, development of treatment plan with patient or relative, ordering and performing treatments and interventions, evaluation of patient's response to treatment, discussion with consultants, interpretation of cardiac measurements, discussions with primary provider, re-evaluation of patient's conition, ventilator management and end of life discussions.   Critical Care Condition: critical                        Clinical Impression:   Final diagnoses:  [W19.XXXA] Fall  [R41.82] AMS (altered mental status)  [I61.9] Intraparenchymal hemorrhage of brain (Primary)  [I60.9] Subarachnoid hemorrhage  [R41.82] Altered mental status, unspecified altered mental status type  [S40.012A] Contusion of left shoulder, initial encounter  [R11.10] Vomiting, unspecified vomiting type, unspecified whether nausea present  [I16.1] Hypertensive emergency        ED Disposition Condition    Transfer to Another Facility Stable                Farzaneh Donnelly MD  08/01/23 0741

## 2023-08-01 NOTE — PROCEDURES
"Sho Lennon is a 83 y.o. female patient.    Temp: (!) 92.9 °F (33.8 °C) (08/01/23 1228)  Pulse: (!) 57 (08/01/23 1800)  Resp: (!) 21 (08/01/23 1800)  BP: (!) 78/53 (08/01/23 1800)  SpO2: 100 % (08/01/23 1800)  Weight: 50 kg (110 lb 3.7 oz) (08/01/23 1800)  Height: 4' 11" (149.9 cm) (08/01/23 1800)       Arterial Line    Date/Time: 8/1/2023 6:21 PM  Location procedure was performed: [facility] NEURO CRITICAL CARE    Performed by: Herminia Nagel NP  Authorized by: Herminia Nagel NP  Consent Done: Emergent Situation  Indications: multiple ABGs, respiratory failure and hemodynamic monitoring  Location: right brachial  Alexi's test normal: yes  Needle gauge: 20  Number of attempts: 2  Complications: No  Specimens: No  Implants: No  Post-procedure: dressing applied          8/1/2023    "

## 2023-08-01 NOTE — CONSULTS
[facility] - Emergency Dept  Neurosurgery  Consult Note    Inpatient consult to Neurosurgery  Consult performed by: Lewis Charlton MD  Consult ordered by: Sunni Root MD        Subjective:     Chief Complaint/Reason for Admission: aSAH rupture    History of Present Illness: 83F presents from OSH with ruptured aneurysmal SAH.    Unable to gather patient history, intubated and unresponsive. HPI per earlier notes:  83-year-old female who presents as a transfer from an outside facility.  Found unresponsive on her bathroom floor with hypertension.  Seen at outside hospital where she was intubated for GCS 15.  Found to have a very large intracranial bleed.  She was started on nicardipine.  Intubated at around 5:00 a.m..  Has been on propofol and Cardizem with last blood pressure in the 120s for EMS.  Has not had any movement or response to pain.  They do report a bruise on the left shoulder secondary to the fall.      (Not in a hospital admission)      Review of patient's allergies indicates:   Allergen Reactions    Keflex [cephalexin]        Past Medical History:   Diagnosis Date    Kidney stones     Kidney stones     Thyroid disease     Thyroid nodule     Wears glasses      Past Surgical History:   Procedure Laterality Date    APPENDECTOMY       SECTION      x5    CYSTOSCOPY      HYSTERECTOMY      SKIN CANCER EXCISION      Mohs x4 and BCC x3-4 on face    SURGICAL REMOVAL OF MASS OF LOWER EXTREMITY Right 2019    Procedure: EXCISION, MASS, LOWER EXTREMITY  thigh;  Surgeon: Long Menendez MD;  Location: [facility];  Service: General;  Laterality: Right;    TONSILLECTOMY       Family History       Problem Relation (Age of Onset)    Breast cancer Mother, Maternal Aunt    Diabetes Father    Heart attack Father    Hypertension Mother    Lung disease Father    Other Brother, Brother    Prostate cancer Brother    Throat cancer Father          Tobacco Use    Smoking status: Never    Smokeless tobacco: Never    Substance and Sexual Activity    Alcohol use: Yes     Comment: Socially    Drug use: Never    Sexual activity: Not Currently     Partners: Male     Review of Systems  Objective:     Weight: 50 kg (110 lb 3.7 oz) (Simultaneous filing. User may not have seen previous data.)  Body mass index is 22.26 kg/m².  Vital Signs (Most Recent):  Temp: (!) 92.9 °F (33.8 °C) (08/01/23 1228)  Pulse: (!) 54 (08/01/23 1247)  Resp: 20 (08/01/23 1047)  BP: (!) 115/58 (08/01/23 1247)  SpO2: 100 % (08/01/23 1247) Vital Signs (24h Range):  Temp:  [92.9 °F (33.8 °C)-94.1 °F (34.5 °C)] 92.9 °F (33.8 °C)  Pulse:  [] 54  Resp:  [13-28] 20  SpO2:  [99 %-100 %] 100 %  BP: ()/() 115/58                Vent Mode: A/C  Oxygen Concentration (%):  [30-50] 30  Resp Rate Total:  [14 br/min-26 br/min] 14 br/min  Vt Set:  [400 mL] 400 mL  PEEP/CPAP:  [5 cmH20] 5 cmH20  Pressure Support:  [0 cmH20] 0 cmH20  Mean Airway Pressure:  [7.5 cmH20-10 cmH20] 7.5 cmH20             NG/OG Tube 08/01/23 0959 Center mouth (Active)            Urethral Catheter 08/01/23 0600 (Active)          Physical Exam     E1VTM3.   Pupils fixed, dilated, 7mm.   No corneal. +cough.   RUE no movement, LUE abdnormal posturing, LLE WD, RLE extensor  +blackwell  +Bruising on L shoulder    Neurosurgery Physical Exam    Significant Labs:  Recent Labs   Lab 08/01/23  0622 08/01/23  1032   * 252*    137   K 3.2* 3.5    105   CO2 21* 18*   BUN 21 18   CREATININE 0.6 0.7   CALCIUM 9.0 9.2     Recent Labs   Lab 08/01/23  0604 08/01/23  0622 08/01/23  1032   WBC  --  12.79* 18.33*   HGB  --  13.0 12.3   HCT 39 38.8 36.6*   PLT  --  185 219     Recent Labs   Lab 08/01/23  0622   INR 1.0   APTT 23.8     Microbiology Results (last 7 days)       ** No results found for the last 168 hours. **          Recent Lab Results  (Last 5 results in the past 24 hours)        08/01/23  1032   08/01/23  0630   08/01/23  0629   08/01/23  0622   08/01/23  0615         Benzodiazepines     Negative           Phencyclidine     Negative           Albumin 3.8       4.4         Alcohol, Serum       <5         Alkaline Phosphatase 60       64         ALT 28       32         Amphetamine Screen, Ur     Negative           Anion Gap 14       11         Appearance, UA   Clear             aPTT       23.8  Comment: Refer to local heparin nomogram for intensity/dose specific   therapeutic   range.           AST 50  Comment: *Result may be interfered by visible hemolysis       45         Bacteria, UA   Negative             Barbiturate Screen, Ur     Negative           Baso # 0.05       0.03         Basophil % 0.3       0.2         Bilirubin (UA)   Negative             BILIRUBIN TOTAL 1.2  Comment: For infants and newborns, interpretation of results should be based  on gestational age, weight and in agreement with clinical  observations.    Premature Infant recommended reference ranges:  Up to 24 hours.............<8.0 mg/dL  Up to 48 hours............<12.0 mg/dL  3-5 days..................<15.0 mg/dL  6-29 days.................<15.0 mg/dL         0.9  Comment: For infants and newborns, interpretation of results should be based  on gestational age, weight and in agreement with clinical  observations.    Premature Infant recommended reference ranges:  Up to 24 hours.............<8.0 mg/dL  Up to 48 hours............<12.0 mg/dL  3-5 days..................<15.0 mg/dL  6-29 days.................<15.0 mg/dL           BUN 18       21         Calcium 9.2       9.0         Chloride 105       104         CO2 18       21         Cocaine (Metab.)     Negative           Color, UA   Colorless             Creatinine 0.7       0.6         Creatinine, Urine     11.0  Comment: The random urine reference ranges provided were established   for 24 hour urine collections.  No reference ranges exist for  random urine specimens.  Correlate clinically.             Differential Method Automated       Automated          eGFR >60.0       >60.0         Eos # 0.0       0.0         Eosinophil % 0.1       0.2         Glucose 252       240         Glucose, UA   3+             Gran # (ANC) 16.4       11.3         Gran % 89.6       88.2         Hematocrit 36.6       38.8         Hemoglobin 12.3       13.0         Hepatitis C Ab Non-reactive               HIV 1/2 Ag/Ab Non-reactive               Hyaline Casts, UA   1             Immature Grans (Abs) 0.10  Comment: Mild elevation in immature granulocytes is non specific and   can be seen in a variety of conditions including stress response,   acute inflammation, trauma and pregnancy. Correlation with other   laboratory and clinical findings is essential.         0.08  Comment: Mild elevation in immature granulocytes is non specific and   can be seen in a variety of conditions including stress response,   acute inflammation, trauma and pregnancy. Correlation with other   laboratory and clinical findings is essential.           Immature Granulocytes 0.5       0.6         INR       1.0  Comment: Coumadin Therapy:  2.0 - 3.0 for INR for all indicators except mechanical heart valves  and antiphospholipid syndromes which should use 2.5 - 3.5.           Ketones, UA   2+             Lactate, Asif 2.4  Comment: Falsely low lactic acid results can be found in samples   containing >=13.0 mg/dL total bilirubin and/or >=3.5 mg/dL   direct bilirubin.         2.4  Comment: Falsely low lactic acid results can be found in samples   containing >=13.0 mg/dL total bilirubin and/or >=3.5 mg/dL   direct bilirubin.  LA critical result(s) called and verbal readback obtained from Sally Love RN by HS3 08/01/2023 08:01           Leukocytes, UA   Negative             Lymph # 0.5       0.7         Lymph % 2.8       5.2         MCH 29.6       29.9         MCHC 33.6       33.5         MCV 88       89         Microscopic Comment   SEE COMMENT  Comment: Other formed elements not mentioned in the report are not   present  in the microscopic examination.                Mono # 1.2       0.7         Mono % 6.7       5.6         MPV 10.9       10.8         NITRITE UA   Negative             nRBC 0       0         Occult Blood UA   Trace             Opiate Scrn, Ur     Negative           pH, UA   8.0             Platelets 219  Comment: occasional Plt clumps present.        185         POC Creatinine         0.6       Potassium 3.5  Comment: *Result may be interfered by visible hemolysis       3.2         PROTEIN TOTAL 6.9  Comment: *Result may be interfered by visible hemolysis       7.6         Protein, UA   1+  Comment: Recommend a 24 hour urine protein or a urine   protein/creatinine ratio if globulin induced proteinuria is  clinically suspected.               Protime       10.8         RBC 4.15       4.35         RBC, UA   1             RDW 13.7       13.5         Sample         VENOUS       Sodium 137       136         Specific [place], UA   1.010             Specimen UA   Urine, Clean Catch             Squam Epithel, UA   0             Marijuana (THC) Metabolite     Negative           Toxicology Information     SEE COMMENT  Comment: This screen includes the following classes of drugs at the   listed cut-off:    Benzodiazepines                  200 ng/ml  Cocaine metabolite               300 ng/ml  Opiates                          300 ng/ml  Barbiturates                     200 ng/ml  Amphetamines                    1000 ng/ml  Marijuana metabs (THC)            50 ng/ml  Phencyclidine (PCP)               25 ng/ml    High concentrations of Methylenedioxymethamphetamine (MDMA aka  Ectasy) and other structurally similar compounds may cross-   react with the Amphetamine/Methamphetamine screening   immunoassay giving a false positive result.    Note: This exception list includes only more common   interferants in toxicology screen testing.  Because of many   cross-reactantspositive results on toxicology drug screens   should be confirmed  whenever results do not correlate with   clinical presentation.    This report is intended for use in clinical monitoring and  management of patients. It is not intended for use in   employment related drug testing.    Because of any cross-reactants, positive results on toxicology  drug screens should be confirmed whenever results do not  correlate with clinical presentation.    Presumptive positive results are unconfirmed and may be used   only for medical purposes.             Troponin I 0.026  Comment: The reference interval for Troponin I represents the 99th percentile   cutoff   for our facility and is consistent with 3rd generation assay   performance.                 Troponin I High Sensitivity       11.5  Comment: Troponin results differ between methods. Do not use   results between Troponin methods interchangeably.    Alkaline Phospatase levels above 400 U/L may   cause false positive results.    Access hsTnI should not be used for patients taking   Asfotase jada (Strensiq).           UROBILINOGEN UA   Negative             WBC, UA   0             WBC 18.33       12.79         Yeast, UA   None                                    Significant Diagnostics:  CT: CT Head Without Contrast    Result Date: 8/1/2023  Stable extensive subarachnoid hemorrhage.  Similar prominent midline shift to the left measuring 13 mm at the level of 3rd ventricle.  Similar mild lateral ventricular dilatation with intraventricular hemorrhage. Electronically signed by: Maicol Melgar Date:    08/01/2023 Time:    10:35   MRI: No results found in the last 24 hours.    Assessment/Plan:     Aneurysmal subarachnoid hemorrhage  Sho Lennon is a 83 y.o. female presenting with HH5mF4 aSAH.     Poor exam after sugammadex reversal, E1VTM3. AOx0.    Discuss goals of care with family.   Treatment plan to follow team discussion with vascular neurology and neuroICU.      --Patient was examined and all labs and diagnostics have been reviewed with  "staff  --A meaningful discussion was had with the entire family to discuss goals of care in the patient's present condition. It was explained that his HH score of 5 conveys a % 30 day mortality with far smaller chance of meaningful recovery (without the need of PEG/Trach and/or being able to care for herself) and that her Blair score of 4 placed her at further risk for post-treatment complications. The patient's family met privately to discuss patient's wishes and decided that shewould not have wanted intervention given such a high likelihood of having to live in a dependent state. They have opted against the possibility to NSGY intervention with ventricular drain and/or attempts to secure the aneurysm via endovascular or open vascular procedures and have elected for comfort care. All questions were answered.  --No neurosurgical intervention per family wishes  --We will sign off at this time, thank you for your consult"    Lewis Charlton MD  Neurosurgery  [facility] - Emergency Dept  "

## 2023-08-01 NOTE — ANESTHESIA PREPROCEDURE EVALUATION
[facility]  Anesthesia Pre-Operative Evaluation   08/01/2023        Sho Lennon, 1940  [id number]  Procedure(s) (LRB):  CRANIECTOMY (right); w/ EVD PLACEMENT (CLASS A) (Right)    Subjective    Sho Lennon is a 83 y.o. female w/ a significant PMHx of HTN, hypothyroidism presented with large SAH with right ICH. Right to left midline shift measured at 14 mm. IVH in four ventricles. CTA shows right pcom aneurysm.     Patient now presents for above procedure(s).     Intubated  On nicardipine for SBP <140mmHg    Prev Airway: Currently intubated    LDA:        Peripheral IV - Single Lumen 08/01/23 0540 18 G Left Antecubital (Active)   Site Assessment Clean;Dry;Intact;No redness;No swelling 08/01/23 0549   Dressing Status Clean;Dry;Intact 08/01/23 0549   Number of days: 0            Peripheral IV - Single Lumen 08/01/23 0959 Anterior;Right Upper Arm (Active)   Site Assessment Clean;Dry;Intact 08/01/23 0959   Number of days: 0            NG/OG Tube 08/01/23 0959 Center mouth (Active)   Number of days: 0            Urethral Catheter 08/01/23 0600 (Active)   Number of days: 0       Drips:    sodium chloride 0.9% 50 mL/hr at 08/01/23 1400    nicardipine Stopped (08/01/23 1147)       Patient Active Problem List   Diagnosis    Thyroid nodule    Pre-diabetes    Anxiety    Subcutaneous mass    Hypothyroidism    Osteopenia    Essential hypertension    Need for vaccination with 13-polyvalent pneumococcal conjugate vaccine    Aneurysmal subarachnoid hemorrhage    Dryden coma scale total score 3-8    On mechanically assisted ventilation    Brain herniation    Vasogenic cerebral edema       Review of patient's allergies indicates:   Allergen Reactions    Keflex [cephalexin]        Current Inpatient Medications:    sodium chloride (23.4%) HYPERTONIC  30 mL Intravenous Once       No current facility-administered medications on file prior to encounter.     Current Outpatient Medications on File  Prior to Encounter   Medication Sig Dispense Refill    cetirizine (ZYRTEC) 10 MG tablet Take 1 tablet (10 mg total) by mouth once daily. 30 tablet 0    levothyroxine (SYNTHROID) 50 MCG tablet Take 1 po qd on Matthew, , , Sa 60 tablet 3    levothyroxine (SYNTHROID) 75 MCG tablet Take 1 po qd on M, W, F 36 tablet 3    losartan-hydrochlorothiazide 50-12.5 mg (HYZAAR) 50-12.5 mg per tablet Take 1 tablet by mouth once daily. 90 tablet 3    pseudoephedrine (SUDAFED) 30 MG tablet Take 60 mg by mouth every 4 (four) hours as needed for Congestion.         Past Surgical History:   Procedure Laterality Date    APPENDECTOMY       SECTION      x5    CYSTOSCOPY      HYSTERECTOMY      SKIN CANCER EXCISION      Mohs x4 and BCC x3-4 on face    SURGICAL REMOVAL OF MASS OF LOWER EXTREMITY Right 2019    Procedure: EXCISION, MASS, LOWER EXTREMITY  thigh;  Surgeon: Long Menendez MD;  Location: [facility];  Service: General;  Laterality: Right;    TONSILLECTOMY         Social History:  Tobacco Use: Low Risk  (2023)    Patient History     Smoking Tobacco Use: Never     Smokeless Tobacco Use: Never     Passive Exposure: Not on file       Alcohol Use: Not on file       Objective    Vital Signs Range:  BMI Readings from Last 1 Encounters:   23 22.26 kg/m²       Temp:  [33.8 °C (92.9 °F)-34.5 °C (94.1 °F)]   Pulse:  []   Resp:  [13-28]   BP: ()/()   SpO2:  [99 %-100 %]        Significant Labs:        Component Value Date/Time    WBC 18.33 (H) 2023 1032    HGB 12.3 2023 1032    HCT 36.6 (L) 2023 1032    HCT 39 2023 0604     2023 1032     2023 1032    K 3.5 2023 1032     2023 1032    CO2 18 (L) 2023 1032     (H) 2023 1032    BUN 18 2023 1032    CREATININE 0.7 2023 1032    CALCIUM 9.2 2023 1032    ALBUMIN 3.8 2023 1032    PROT 6.9 2023 1032    ALKPHOS 60 2023 1032     BILITOT 1.2 (H) 08/01/2023 1032    AST 50 (H) 08/01/2023 1032    ALT 28 08/01/2023 1032    INR 1.0 08/01/2023 0622    HGBA1C 5.5 06/13/2023 1054        Please see Results Review for additional labs.     Diagnostic Studies: All relevant studies, reviewed.      EKG:   Results for orders placed or performed during the hospital encounter of 08/01/23   EKG 12-lead    Collection Time: 08/01/23 10:30 AM    Narrative    Test Reason : I63.9,    Vent. Rate : 065 BPM     Atrial Rate : 065 BPM     P-R Int : 160 ms          QRS Dur : 082 ms      QT Int : 472 ms       P-R-T Axes : 021 032 024 degrees     QTc Int : 490 ms    Sinus rhythm with Premature supraventricular complexes  Nonspecific ST abnormality  Prolonged QT  Abnormal ECG  When compared with ECG of 01-AUG-2023 05:50,  No significant change was found  Confirmed by Chloé Aguilar MD ([id number]) on 8/1/2023 1:45:25 PM    Referred By: YOMI PHILLIP           Confirmed By:Chloé Aguilar MD       ECHO:  No results found for this or any previous visit.        Pre-op Assessment          Review of Systems      Physical Exam  General: Unconscious    Airway:  Mallampati: unable to assess   Pre-Existing Airway: Oral Endotracheal tube        Anesthesia Plan  Type of Anesthesia, risks & benefits discussed:    Anesthesia Type: Gen ETT  Intra-op Monitoring Plan: Standard ASA Monitors, Art Line and Central Line  Post Op Pain Control Plan: multimodal analgesia and IV/PO Opioids PRN  Induction:  Inhalation and IV  Informed Consent: Informed consent signed with the Patient representative and all parties understand the risks and agree with anesthesia plan.  All questions answered.   ASA Score: 4 Emergent  Day of Surgery Review of History & Physical: H&P Update referred to the surgeon/provider.    Ready For Surgery From Anesthesia Perspective.     .

## 2023-08-01 NOTE — PLAN OF CARE
Patient's belongings (jewelry) returned to family member @ 1804.    Bone flap returned to blood bank @1807 to [place].

## 2023-08-01 NOTE — CONSULTS
Consulted for this 82 yo female with large SAH with right ICH. Right to left midline shift measured at 14 mm. IVH in four ventricles. CTA shows right pcom aneurism. /86.  Called house supervisor to inform them that this patient needs to be transferred to [facility] as soon as possible.   Systolic BP goals <140 mmHg with labetalol IV  Intubate   Propofol sedation  Cardiac monitor  Nimotop 60 mg q4h x 12 days  HOB 30  Normothermic  Consider giving Mannitol 20% 1g/kg IV before transfer    Cy Sawyer MD

## 2023-08-01 NOTE — H&P
"[facility] - Surgery ([facility])  Neurocritical Care  History & Physical    Admit Date: 8/1/2023  Service Date: 08/01/2023  Length of Stay: 0    Subjective:     Chief Complaint: Acute spontaneous subarachnoid intracranial hemorrhage due to cerebral aneurysm    History of Present Illness: Sho Lennon is an 82 yo F with HTN and hypothyroidism who was transferred from [facility] due to an extensive intracranial hemorrhage. This morning she was found on the bathroom floor unresponsive, GCS of 6 with EMS but 3 on arrival to [place] ED. EMS also reported SBP in there 200s and signs of trauma to the shoulder. CTH at [place] showed "large intraparenchymal hematoma occupying the right basal ganglia area dimensions of at least 4.4 x 5.4 x 7.9 cm. This hemorrhage has intraventricular and subarachnoid extension of the large volume of blood noted throughout the basilar subarachnoid space and the right temporal fossa region...some interhemispheric subarachnoid blood as well." CTA showed "Closely associated with the supraclinoid right ICA and extending posteriorly, an expected location of the right posterior communicating artery, is amorphous collection of contrast measuring 10 x 9 mm, suspicious for a ruptured aneurysm." At [place], underwent RSI with succinylcholine 70 mg and etomidate 20 mg; sedated with propofol. Nicardipine started for BP management. The decision was made to transfer to [facility].    Upon arrival to the [facility] ED: patient remained intubated, propofol was discontinued. On exam, pupils were fixed and dilated; no response to pain. Repeat CTH shows "stable extensive subarachnoid hemorrhage...prominent midline shift to the left measuring 13 mm at the level of 3rd ventricle...mild lateral ventricular dilatation with intraventricular hemorrhage." Given mannitol 50 g IV x 1 & Bridion 100 mg IV x 1.       Past Medical History:   Diagnosis Date    Kidney stones     Kidney stones     Thyroid " disease     Thyroid nodule     Wears glasses      Past Surgical History:   Procedure Laterality Date    APPENDECTOMY       SECTION      x5    CYSTOSCOPY      HYSTERECTOMY      SKIN CANCER EXCISION      Mohs x4 and BCC x3-4 on face    SURGICAL REMOVAL OF MASS OF LOWER EXTREMITY Right 2019    Procedure: EXCISION, MASS, LOWER EXTREMITY  thigh;  Surgeon: Long Menendez MD;  [facility];  Service: General;  Laterality: Right;    TONSILLECTOMY        Current Facility-Administered Medications on File Prior to Encounter   Medication Dose Route Frequency Provider Last Rate Last Admin    [COMPLETED] 0.9%  NaCl infusion  1,000 mL Intravenous ED 1 Time Farzaneh Donnelly MD 75 mL/hr at 2347 1,000 mL at 23    [COMPLETED] iohexoL (OMNIPAQUE 350) injection 100 mL  100 mL Intravenous ONCE PRN Farzaneh Donnelly MD   100 mL at 2350    [DISCONTINUED] etomidate injection   Intravenous Code/trauma/sedation Med Farzaneh Donnelly MD   20 mg at 23    [DISCONTINUED] niCARdipine 40 mg/200 mL (0.2 mg/mL) infusion  0-15 mg/hr Intravenous Continuous Farzaneh Donnelly MD 12.5 mL/hr at 2308 2.5 mg/hr at 23    [DISCONTINUED] propofol (DIPRIVAN) 10 mg/mL infusion  0-50 mcg/kg/min Intravenous Continuous Farzaneh Donnelly MD 4.5 mL/hr at 2347 15 mcg/kg/min at 23    [DISCONTINUED] succinylcholine injection   Intravenous Code/trauma/sedation Med Farzaneh Donnelly MD   70 mg at 23     Current Outpatient Medications on File Prior to Encounter   Medication Sig Dispense Refill    cetirizine (ZYRTEC) 10 MG tablet Take 1 tablet (10 mg total) by mouth once daily. 30 tablet 0    levothyroxine (SYNTHROID) 50 MCG tablet Take 1 po qd on , , , Sa 60 tablet 3    levothyroxine (SYNTHROID) 75 MCG tablet Take 1 po qd on M, W, F 36 tablet 3    losartan-hydrochlorothiazide 50-12.5 mg (HYZAAR) 50-12.5 mg per tablet Take 1  tablet by mouth once daily. 90 tablet 3    pseudoephedrine (SUDAFED) 30 MG tablet Take 60 mg by mouth every 4 (four) hours as needed for Congestion.        Allergies: Keflex [cephalexin]    Family History   Problem Relation Age of Onset    Hypertension Mother     Breast cancer Mother     Diabetes Father     Heart attack Father     Lung disease Father         mesothelioma     Throat cancer Father     Prostate cancer Brother     Other Brother         pre-diabetes    Other Brother         pre-diabetes    Breast cancer Maternal Aunt      Social History     Tobacco Use    Smoking status: Never    Smokeless tobacco: Never   Substance Use Topics    Alcohol use: Yes     Comment: Socially    Drug use: Never     Review of Systems   Unable to perform ROS: Intubated     Objective:     Vitals:    Temp: (!) 92.9 °F (33.8 °C)  Pulse: 61  BP: (!) 127/57  MAP (mmHg): 82  Resp: 20  SpO2: 100 %  Oxygen Concentration (%): 30  Vent Mode: A/C  Set Rate: 14 BPM  Vt Set: 400 mL  PEEP/CPAP: 5 cmH20  Peak Airway Pressure: 21 cmH20  Mean Airway Pressure: 7.5 cmH20  Plateau Pressure: 0 cmH20    Temp  Min: 92.9 °F (33.8 °C)  Max: 94.1 °F (34.5 °C)  Pulse  Min: 54  Max: 101  BP  Min: 95/55  Max: 233/105  MAP (mmHg)  Min: 72  Max: 108  Resp  Min: 13  Max: 28  ETCO2 (mmHg)  Min: 0 mmHg  Max: 0 mmHg  SpO2  Min: 99 %  Max: 100 %  Oxygen Concentration (%)  Min: 30  Max: 50    No intake/output data recorded.            Physical Exam  Vitals and nursing note reviewed.   Neck:      Comments: Neck collar in place  Pulmonary:      Comments: Intubated  Neurological:      Comments: GCS E1 V(NT) M1  Pupils fixed and dilated  No corneal reflex  + cough         Unable to test orientation, language, memory, judgment, insight, fund of knowledge, hearing, shoulder shrug, tongue protrusion, coordination, gait due to level of consciousness.       Today I personally reviewed pertinent medications, lines/drains/airways, imaging, laboratory results,  "notably: Repeat CTH: "stable extensive subarachnoid hemorrhage...prominent midline shift to the left measuring 13 mm at the level of 3rd ventricle...mild lateral ventricular dilatation with intraventricular hemorrhage."       Assessment/Plan:     Neuro  * Acute spontaneous subarachnoid intracranial hemorrhage due to cerebral aneurysm  On mechanically assisted ventilation  Nicolas coma scale total score 3-8  Hypertensive emergency    Found unresponsive the morning of 8/1. Intubated 8/1/2023 d/t GCS 3, initially on propofol which was discontinued later that morning. Patient hypertensive with SBP in 200s, transferred on Cardene gtt. CTH: extensive SAH with prominent 13 mm left midline shift. 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 GOC discussion held with family, who decided to pursue treatment at this time. S/p mannitol 50 g IV x 1 & Bridion 100 mg IV x 1.    - NSGY following, patient will undergo right craniectomy with EVD placement today  - Continue Cardene gtt to maintain SBP < 140 (hold home Hyzaar as patient is currently intubated)  - 3% hypertonic saline starting at 50 cc/hr; titrate to 145-155 (CMP q6h to monitor)  - q1h neuro checks to monitor for improvement  - If improvement, consider angio to assess for possible embolization              Endocrine  Hypothyroid  Most recent TSH 1.248 on 6/13/2023  Will hold current home regimen of 75 mcg MWF + 50 mcg S/T/TH/Sat; if patient remains intubated, will order IV Synthroid on 8/4/2023          The patient is being Prophylaxed for:  Venous Thromboembolism with: Mechanical  Stress Ulcer with: H2B  Ventilator Pneumonia with: chlorhexidine oral care    Activity Orders          Turn patient starting at 08/01 1800    Elevate HOB starting at 08/01 1628    Diet NPO: NPO starting at 08/01 1628        Full Code    Lico Tripathi MD  Neurocritical Care  [facility] - Surgery (2nd Fl)  "

## 2023-08-01 NOTE — ED NOTES
Neal here for transport unhooking patient to transfer to [facility], speaking with family at bedside

## 2023-08-01 NOTE — ASSESSMENT & PLAN NOTE
Sho Lennon is a 83 y.o. female presenting with HH4mF4 aSAH.     Poor exam after sugammadex reversal, E1VTM3. AOx0.    Discuss goals of care with family.   Treatment plan to follow team discussion with vascular neurology and neuroICU.

## 2023-08-01 NOTE — CARE UPDATE
08/01/23 0654   Patient Assessment/Suction   Level of Consciousness (AVPU) unresponsive   Respiratory Effort Normal   Expansion/Accessory Muscles/Retractions no use of accessory muscles;no retractions;expansion symmetric   All Lung Fields Breath Sounds diminished   Rhythm/Pattern, Respiratory assisted mechanically   Cough Frequency with stimulation   Cough Type assisted   Suction Method tracheal   Suction Pressure (mmHg) -120 mmHg   $ Suction Charges Inline Suction Procedure Stat Charge   Secretions Amount small   Secretions Color white   PRE-TX-O2   Device (Oxygen Therapy) ventilator   $ Is the patient on Low Flow Oxygen? Yes   Oxygen Concentration (%) 40   SpO2 100 %   Pulse Oximetry Type Continuous   $ Pulse Oximetry - Multiple Charge Pulse Oximetry - Multiple   Pulse 84   Resp (!) 26   ETCO2   ETCO2 (mmHg) 0 mmHg        Airway - Non-Surgical 08/01/23 0555 Endotracheal Tube   Placement Date/Time: 08/01/23 0555   Method of Intubation: Glidescope  Inserted by: MD  Airway Device: Endotracheal Tube  Mask Ventilation: Easy  Blade: Glidescope #3  Airway Device Size: 7.0  Style: Cuffed  Cuff Inflation: Minimal occlusive pressure ...   Secured at 22 cm   Measured At Teeth   Site Condition Cool;Dry   Vent Select   Charged w/in last 24h YES   Preset Conventional Ventilator Settings   Vent Type    Ventilation Type VC   Vent Mode A/C   Set Rate 26 BPM   Vt Set 400 mL   PEEP/CPAP 5 cmH20   Pressure Support 0 cmH20   Waveform RAMP   Peak Flow 60 L/min   Plateau Set/Insp. Hold (sec) 0   Trigger Sensitivity Flow/I-Trigger 3 L/min   Patient Ventilator Parameters   Resp Rate Total 26 br/min   Peak Airway Pressure 20 cmH20   Mean Airway Pressure 10 cmH20   Plateau Pressure 0 cmH20   Exhaled Vt 407 mL   Total Ve 10.6 L/m   I:E Ratio Measured 1:2.20   Conventional Ventilator Alarms   Alarms On Y   Resp Rate High Alarm 40 br/min   Press High Alarm 40 cmH2O   Apnea Rate 26   Apnea Volume (mL) 0 mL   Apnea Oxygen Concentration   100   Apnea Flow Rate (L/min) 60   T Apnea 20 sec(s)   Ready to Wean/Extubation Screen   FIO2<=50 (chart decimal) 0.4   MV<16L (chart vol.) 10.6   PEEP <=8 (chart #) 5   Ready to Wean Parameters   F/VT Ratio<105 (RSBI) (!) 63.88

## 2023-08-01 NOTE — NURSING
Patient arrived to [facility] from [facility] ED>OR>[id number]    Report received from: CRNA    Type of stroke/diagnosis:  SAH from aneurysm    Current symptoms: intubated, pupils fixed, no movement all 4 extremities, no cough, no gag, no corneal reflex    Skin Assessment done: Yes  Wounds noted: Right head incision   Skin Assessment Verified by:  felicitas Chambers Completed? Yes- failed    Patient Belongings on Admit: (be specific) no belongings    NCC notified: oni gutierrez md

## 2023-08-01 NOTE — TRANSFER OF CARE
Anesthesia Transfer of Care Note    Patient: Sho Lennon    Procedure(s) Performed: Procedure(s) (LRB):  CRANIECTOMY (right); w/ EVD PLACEMENT (CLASS A) (Right)    Patient location: ICU    Anesthesia Type: general    Transport from OR: Continuous ECG monitoring in transport. Continuous SpO2 monitoring in transport. Continuos invasive BP monitoring in transport. Transported from OR intubated on 100% O2 by AMBU with adequate controlled ventilation    Post pain: adequate analgesia    Post assessment: no apparent anesthetic complications    Post vital signs: stable    Level of consciousness: sedated    Nausea/Vomiting: no nausea/vomiting    Complications: none    Transfer of care protocol was followed      Last vitals:   Visit Vitals  BP (!) 127/57   Pulse 61   Temp (!) 33.8 °C (92.9 °F) (Rectal)   Resp 20   Wt 50 kg (110 lb 3.7 oz)   SpO2 100%   BMI 22.26 kg/m²

## 2023-08-01 NOTE — HPI
"Sho Lennon is an 82 yo F with HTN and hypothyroidism who was transferred from [facility] due to an extensive intracranial hemorrhage. This morning she was found on the bathroom floor unresponsive, GCS of 6 with EMS but 3 on arrival to [place] ED. EMS also reported SBP in there 200s and signs of trauma to the shoulder. CTH at [place] showed "large intraparenchymal hematoma occupying the right basal ganglia area dimensions of at least 4.4 x 5.4 x 7.9 cm. This hemorrhage has intraventricular and subarachnoid extension of the large volume of blood noted throughout the basilar subarachnoid space and the right temporal fossa region...some interhemispheric subarachnoid blood as well." CTA showed "Closely associated with the supraclinoid right ICA and extending posteriorly, an expected location of the right posterior communicating artery, is amorphous collection of contrast measuring 10 x 9 mm, suspicious for a ruptured aneurysm." At [place], underwent RSI with succinylcholine 70 mg and etomidate 20 mg; sedated with propofol. Nicardipine started for BP management. The decision was made to transfer to [facility].    Upon arrival to the [facility] ED: patient remained intubated, propofol was discontinued. On exam, pupils were fixed and dilated; no response to pain. Repeat CTH shows "stable extensive subarachnoid hemorrhage...prominent midline shift to the left measuring 13 mm at the level of 3rd ventricle...mild lateral ventricular dilatation with intraventricular hemorrhage." Given mannitol 50 g IV x 1 & Bridion 100 mg IV x 1.   "

## 2023-08-01 NOTE — ASSESSMENT & PLAN NOTE
Most recent TSH 1.248 on 6/13/2023  Will hold current home regimen of 75 mcg MWF + 50 mcg S/T/TH/Sat; if patient remains intubated, will order IV Synthroid on 8/4/2023

## 2023-08-01 NOTE — SUBJECTIVE & OBJECTIVE
Past Medical History:   Diagnosis Date    Kidney stones     Kidney stones     Thyroid disease     Thyroid nodule     Wears glasses      Past Surgical History:   Procedure Laterality Date    APPENDECTOMY       SECTION      x5    CYSTOSCOPY      HYSTERECTOMY      SKIN CANCER EXCISION      Mohs x4 and BCC x3-4 on face    SURGICAL REMOVAL OF MASS OF LOWER EXTREMITY Right 2019    Procedure: EXCISION, MASS, LOWER EXTREMITY  thigh;  Surgeon: Long Menendez MD;  [facility];  Service: General;  Laterality: Right;    TONSILLECTOMY        Current Facility-Administered Medications on File Prior to Encounter   Medication Dose Route Frequency Provider Last Rate Last Admin    [COMPLETED] 0.9%  NaCl infusion  1,000 mL Intravenous ED 1 Time Farzaneh Donnelly MD 75 mL/hr at 2347 1,000 mL at 23    [COMPLETED] iohexoL (OMNIPAQUE 350) injection 100 mL  100 mL Intravenous ONCE PRN Farzaneh Donnelly MD   100 mL at 23 0650    [DISCONTINUED] etomidate injection   Intravenous Code/trauma/sedation Med Farzaneh Donnelly MD   20 mg at 2348    [DISCONTINUED] niCARdipine 40 mg/200 mL (0.2 mg/mL) infusion  0-15 mg/hr Intravenous Continuous Farzaneh Donnelly MD 12.5 mL/hr at 23 0708 2.5 mg/hr at 23    [DISCONTINUED] propofol (DIPRIVAN) 10 mg/mL infusion  0-50 mcg/kg/min Intravenous Continuous Farzaneh Donnelly MD 4.5 mL/hr at 23 15 mcg/kg/min at 23    [DISCONTINUED] succinylcholine injection   Intravenous Code/trauma/sedation Med Farzaneh Donnelly MD   70 mg at 2348     Current Outpatient Medications on File Prior to Encounter   Medication Sig Dispense Refill    cetirizine (ZYRTEC) 10 MG tablet Take 1 tablet (10 mg total) by mouth once daily. 30 tablet 0    levothyroxine (SYNTHROID) 50 MCG tablet Take 1 po qd on , , , Sa 60 tablet 3    levothyroxine (SYNTHROID) 75 MCG tablet Take 1 po qd on , , F 36 tablet 3     losartan-hydrochlorothiazide 50-12.5 mg (HYZAAR) 50-12.5 mg per tablet Take 1 tablet by mouth once daily. 90 tablet 3    pseudoephedrine (SUDAFED) 30 MG tablet Take 60 mg by mouth every 4 (four) hours as needed for Congestion.        Allergies: Keflex [cephalexin]    Family History   Problem Relation Age of Onset    Hypertension Mother     Breast cancer Mother     Diabetes Father     Heart attack Father     Lung disease Father         mesothelioma     Throat cancer Father     Prostate cancer Brother     Other Brother         pre-diabetes    Other Brother         pre-diabetes    Breast cancer Maternal Aunt      Social History     Tobacco Use    Smoking status: Never    Smokeless tobacco: Never   Substance Use Topics    Alcohol use: Yes     Comment: Socially    Drug use: Never     Review of Systems   Unable to perform ROS: Intubated     Objective:     Vitals:    Temp: (!) 92.9 °F (33.8 °C)  Pulse: 61  BP: (!) 127/57  MAP (mmHg): 82  Resp: 20  SpO2: 100 %  Oxygen Concentration (%): 30  Vent Mode: A/C  Set Rate: 14 BPM  Vt Set: 400 mL  PEEP/CPAP: 5 cmH20  Peak Airway Pressure: 21 cmH20  Mean Airway Pressure: 7.5 cmH20  Plateau Pressure: 0 cmH20    Temp  Min: 92.9 °F (33.8 °C)  Max: 94.1 °F (34.5 °C)  Pulse  Min: 54  Max: 101  BP  Min: 95/55  Max: 233/105  MAP (mmHg)  Min: 72  Max: 108  Resp  Min: 13  Max: 28  ETCO2 (mmHg)  Min: 0 mmHg  Max: 0 mmHg  SpO2  Min: 99 %  Max: 100 %  Oxygen Concentration (%)  Min: 30  Max: 50    No intake/output data recorded.            Physical Exam  Vitals and nursing note reviewed.   Neck:      Comments: Neck collar in place  Pulmonary:      Comments: Intubated  Neurological:      Comments: GCS E1 V(NT) M1  Pupils fixed and dilated  No corneal reflex  + cough         Unable to test orientation, language, memory, judgment, insight, fund of knowledge, hearing, shoulder shrug, tongue protrusion, coordination, gait due to level of consciousness.       Today I personally reviewed pertinent  "medications, lines/drains/airways, imaging, laboratory results, notably: Repeat CTH: "stable extensive subarachnoid hemorrhage...prominent midline shift to the left measuring 13 mm at the level of 3rd ventricle...mild lateral ventricular dilatation with intraventricular hemorrhage."     "

## 2023-08-01 NOTE — HPI
83F presents from OSH with ruptured aneurysmal SAH.    Unable to gather patient history, intubated and unresponsive. HPI per earlier notes:  83-year-old female who presents as a transfer from an outside facility.  Found unresponsive on her bathroom floor with hypertension.  Seen at outside hospital where she was intubated for GCS 15.  Found to have a very large intracranial bleed.  She was started on nicardipine.  Intubated at around 5:00 a.m..  Has been on propofol and Cardizem with last blood pressure in the 120s for EMS.  Has not had any movement or response to pain.  They do report a bruise on the left shoulder secondary to the fall.

## 2023-08-01 NOTE — RESPIRATORY THERAPY
Pt transferrred in intubated with a 7.0 ETT secured 22 cm at the teeth. Pt placed on the vent at the documented settings. Will continue to monitor.

## 2023-08-02 PROBLEM — N17.9 ACUTE RENAL FAILURE: Status: ACTIVE | Noted: 2023-01-01

## 2023-08-02 PROBLEM — E23.2 DIABETES INSIPIDUS: Status: ACTIVE | Noted: 2023-01-01

## 2023-08-02 PROBLEM — I61.5 IVH (INTRAVENTRICULAR HEMORRHAGE): Status: ACTIVE | Noted: 2023-01-01

## 2023-08-02 PROBLEM — G93.6 VASOGENIC BRAIN EDEMA: Status: ACTIVE | Noted: 2023-01-01

## 2023-08-02 PROBLEM — R57.8 NEUROGENIC SHOCK: Status: ACTIVE | Noted: 2023-01-01

## 2023-08-02 NOTE — PROGRESS NOTES
"[facility]  Neurocritical Care  Progress Note    Admit Date: 8/1/2023  Service Date: 08/02/2023  Length of Stay: 1    Subjective:     Chief Complaint: Acute spontaneous subarachnoid intracranial hemorrhage due to cerebral aneurysm    History of Present Illness: Sho Lennon is an 82 yo F with HTN and hypothyroidism who was transferred from [facility] due to an extensive intracranial hemorrhage. This morning she was found on the bathroom floor unresponsive, GCS of 6 with EMS but 3 on arrival to [place] ED. EMS also reported SBP in there 200s and signs of trauma to the shoulder. CTH at [place] showed "large intraparenchymal hematoma occupying the right basal ganglia area dimensions of at least 4.4 x 5.4 x 7.9 cm. This hemorrhage has intraventricular and subarachnoid extension of the large volume of blood noted throughout the basilar subarachnoid space and the right temporal fossa region...some interhemispheric subarachnoid blood as well." CTA showed "Closely associated with the supraclinoid right ICA and extending posteriorly, an expected location of the right posterior communicating artery, is amorphous collection of contrast measuring 10 x 9 mm, suspicious for a ruptured aneurysm." At [place], underwent RSI with succinylcholine 70 mg and etomidate 20 mg; sedated with propofol. Nicardipine started for BP management. The decision was made to transfer to [facility].    Upon arrival to the [facility] ED: patient remained intubated, propofol was discontinued. On exam, pupils were fixed and dilated; no response to pain. Repeat CTH shows "stable extensive subarachnoid hemorrhage...prominent midline shift to the left measuring 13 mm at the level of 3rd ventricle...mild lateral ventricular dilatation with intraventricular hemorrhage." Given mannitol 50 g IV x 1 & Bridion 100 mg IV x 1.       Hospital Course: 8/1/2023: Overnight, pt with low BP so levo maxed out at 1. This AM, MAPs in 40s " - started on vaso gtt and epi gtt as well, PRN mara bumps. Also found to be in DI (Na 137 > 159 > 176; Cl 105 > 129 > 130), hypertonic saline D/C; given D5w bolus x 2 and started D5w infusion; given 2 mcg of DDAVP. Also has L pneumothorax, PEEP decreased to 3, saturating well on vent. GOC discussions continue with family, will discuss comfort care versus brain death amongst themselves this morning.        Review of Systems   Unable to perform ROS: Intubated     Objective:     Vitals:  Temp: (!) 95.5 °F (35.3 °C)  Pulse: 79  Rhythm: normal sinus rhythm  BP: 115/64  MAP (mmHg): 83  ICP Mean (mmHg): 11 mmHg  Resp: 14  SpO2: 100 %  Oxygen Concentration (%): 40  Vent Mode: A/C  Set Rate: 14 BPM  Vt Set: 300 mL  PEEP/CPAP: 3 cmH20  Peak Airway Pressure: 14 cmH20  Mean Airway Pressure: 5.3 cmH20  Plateau Pressure: 0 cmH20    Temp  Min: 87.4 °F (30.8 °C)  Max: 98.4 °F (36.9 °C)  Pulse  Min: 45  Max: 146  BP  Min: 78/53  Max: 144/74  MAP (mmHg)  Min: 61  Max: 103  ICP Mean (mmHg)  Min: 2 mmHg  Max: 36 mmHg  Resp  Min: 13  Max: 30  SpO2  Min: 94 %  Max: 100 %  Oxygen Concentration (%)  Min: 30  Max: 50    08/01 0701 - 08/02 0700  In: 4200.2 [I.V.:1359.5]  Out: 4317 [Urine:4175; Drains:142]   Unmeasured Output  Stool Occurrence: 0        Physical Exam  Vitals and nursing note reviewed.   Constitutional:       Comments: Intubated   Eyes:      Comments: Pupils fixed and dilated.   Neck:      Comments: C-collar no longer present  Pulmonary:      Comments: On mechanical ventilation  Neurological:      Comments: GCS E1 V(NT) M(1)  No withdrawal to pain in any extremity       Unable to test orientation, language, memory, judgment, insight, fund of knowledge, hearing, shoulder shrug, tongue protrusion, coordination, gait due to level of consciousness.       Medications:  Continuousdextrose 5 % (D5W), Last Rate: 125 mL/hr at 08/02/23 1100  EPINEPHrine, Last Rate: Stopped (08/02/23 0945)  NORepinephrine bitartrate-D5W, Last Rate: 0.08  mcg/kg/min (08/02/23 1100)  vasopressin, Last Rate: 0.04 Units/min (08/02/23 1100)    ScheduledceFAZolin (ANCEF) IVPB, 1 g, Q8H  EPINEPHrine (PF), ,   famotidine (PF), 20 mg, Daily  mupirocin, , BID  ondansetron, 4 mg, Once  phenylephrine HCl in 0.9% NaCl, ,   senna-docusate 8.6-50 mg, 1 tablet, BID    PRNEPINEPHrine (PF), ,   labetalol, 10 mg, Q6H PRN  magnesium oxide, 800 mg, PRN  magnesium oxide, 800 mg, PRN  metoprolol, 5 mg, Q5 Min PRN  phenylephrine HCl in 0.9% NaCl, ,   potassium bicarbonate, 35 mEq, PRN  potassium bicarbonate, 50 mEq, PRN  potassium bicarbonate, 60 mEq, PRN  potassium, sodium phosphates, 2 packet, PRN  potassium, sodium phosphates, 2 packet, PRN  potassium, sodium phosphates, 2 packet, PRN  sodium chloride 0.9%, 10 mL, PRN      Today I personally reviewed pertinent medications, lines/drains/airways, imaging, laboratory results, notably: elevated Na    Diet  Diet NPO      Assessment/Plan:     Neuro  * Acute spontaneous subarachnoid intracranial hemorrhage due to cerebral aneurysm  Neurogenic shock  On mechanically assisted ventilation  Brain herniation  Vasogenic brain edema  IVH (intraventricular hemorrhage)  Nicolas coma scale total score 3-8  Hypertensive emergency (on admission)    Found unresponsive the morning of 8/1. Intubated 8/1/2023 d/t GCS 3, initially on propofol which was discontinued later that morning. Patient hypertensive with SBP in 200s, transferred on Cardene gtt. CTH: extensive SAH with prominent 13 mm left midline shift. 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 GOC discussion held with family, who decided to pursue treatment at this time. S/p mannitol 50 g IV x 1 & Bridion 100 mg IV x 1 in the ED on 8/1. Overnight patient became hypotensive with increasing BP support requirements.    - NSGY following s/p right craniectomy with EVD placement on 8/1  - Cardene gtt stopped as patient now in neurogenic shock with low  MAPs (see below)   - Continue levo, vaso, & epi gtt for BP support  - D/C 3% hypertonic saline  - q1h neuro checks  - GOC held with daughter this AM, she has informed the rest of the family, they are waiting for the patient's  to arrive at the hospital before deciding next steps (options presented were comfort care versus brain death proclamation)              Renal/  Acute renal failure  Cr increased by 0.5 (0.6 > 1.1)    - Will continue to monitor until family makes GOC decision  - IVF    Endocrine  Diabetes insipidus  Na 137 > 159 > 176; Cl 105 > 129 > 130    - hypertonic saline D/C  - given D5W bolus x 2 and started D5w infusion  - given 2 mcg of DDAVP, will re-check CMP 2 hours after administration (~12:15)          The patient is being Prophylaxed for:  Venous Thromboembolism with: None  Stress Ulcer with: H2B  Ventilator Pneumonia with: none    Activity Orders          Turn patient starting at 08/01 1800    Elevate HOB starting at 08/01 1628    Diet NPO: NPO starting at 08/01 1628        Partial Code    Lico Tripathi MD  Neurocritical Care  [facility]

## 2023-08-02 NOTE — ASSESSMENT & PLAN NOTE
Na 137 > 159 > 176; Cl 105 > 129 > 130    - hypertonic saline D/C  - given D5W bolus x 2 and started D5w infusion  - given 2 mcg of DDAVP, will re-check CMP 2 hours after administration (~12:15)

## 2023-08-02 NOTE — ASSESSMENT & PLAN NOTE
Sho Lennon is a 83 y.o. female presenting with [id number] aSAH. Ruptured pcomm aneurysm.     CTH/CTA 8/1: F4 SAH w/ large sylvian clot & 13mm MLS. HCP. PcommA aneurysm w/ extrav    Now s/p right decompressive hemicraniectomy and left sided EVD.     CTH 8/1 post op: EVD In good position, good decompression, MLS improved to 6-7 mm, mibrain compression improved    -- ICU status  -- q1 hour neurochecks  -- aneurysm unsecured   -- SBP < 140   -- keppra   -- Nimotop   -- Strict I/O, goal euvolemia to net positive  -- EVD in at 20, monitor ICP and output  -- Na > 145   -- Recommend early GOC discussion given very poor prognosis  -- If any clinical improvement, will consider angiogram

## 2023-08-02 NOTE — PLAN OF CARE
[facility]  Discharge Assessment    Primary Care Provider: Sonal Lewis MD       Per MD: Patient declared brain dead at 2:37pm      Discharge Assessment (most recent)       BRIEF DISCHARGE ASSESSMENT - 08/02/23 1529          Discharge Planning    Assessment Type Discharge Planning Brief Assessment     Resource/Environmental Concerns none     Support Systems Family members     Equipment Currently Used at Home none     Current Living Arrangements home     Patient/Family Anticipated Services at Transition none     DME Needed Upon Discharge  none     Discharge Plan A Other   Patient declared brain dead at 2:37pm    Discharge Plan B Other   Patient declared brain dead at 2:37pm                    Leanne Salinas, RN, CCRN-K, [facility]  Neuro-Critical Care   [phone]

## 2023-08-02 NOTE — SUBJECTIVE & OBJECTIVE
Interval history: 8/2: OR yesterday for decompressive craniectomy. Cth this morning with improvement in midline shift and brain stem compression. Continued very poor exam.       Objective:     Weight: 50 kg (110 lb 3.7 oz)  Body mass index is 22.26 kg/m².  Vital Signs (Most Recent):  Temp: 97.3 °F (36.3 °C) (08/02/23 0601)  Pulse: 107 (08/02/23 0601)  Resp: 18 (08/02/23 0601)  BP: 112/62 (08/02/23 0601)  SpO2: 97 % (08/02/23 0601) Vital Signs (24h Range):  Temp:  [87.4 °F (30.8 °C)-97.3 °F (36.3 °C)] 97.3 °F (36.3 °C)  Pulse:  [] 107  Resp:  [13-30] 18  SpO2:  [94 %-100 %] 97 %  BP: ()/(50-75) 112/62  Arterial Line BP: ()/(35-62) 121/51                Vent Mode: A/C  Oxygen Concentration (%):  [30-50] 40  Resp Rate Total:  [14 br/min-26 br/min] 19 br/min  Vt Set:  [300 mL-400 mL] 300 mL  PEEP/CPAP:  [3 cmH20-5 cmH20] 3 cmH20  Mean Airway Pressure:  [5.1 cmH20-7.5 cmH20] 5.7 cmH20             NG/OG Tube 08/01/23 0959 Center mouth (Active)            Urethral Catheter 08/01/23 0600 (Active)          Physical Exam     E1VTM3.   Pupils fixed, dilated, 7mm.   No corneal. +cough.   RUE extensor  LUE no movement  BLE min WD    Neurosurgery Physical Exam    Significant Labs:  Recent Labs   Lab 08/01/23  1032 08/01/23  2144 08/02/23  0322   * 182* 163*    159* 176*   K 3.5 3.7 3.1*    129* >130*   CO2 18* 19* 17*   BUN 18 14 13   CREATININE 0.7 0.7 0.7   CALCIUM 9.2 8.9 8.8   MG  --   --  2.6       Recent Labs   Lab 08/01/23  0622 08/01/23  1032 08/02/23  0322 08/02/23  0514 08/02/23  0530   WBC 12.79* 18.33* 14.40*  --   --    HGB 13.0 12.3 12.2  --   --    HCT 38.8 36.6* 37.2 UNAVAILABLE 34*    219 166  --   --        Recent Labs   Lab 08/01/23 0622   INR 1.0   APTT 23.8       Microbiology Results (last 7 days)       ** No results found for the last 168 hours. **          Recent Lab Results  (Last 5 results in the past 24 hours)        08/02/23  0530   08/02/23  0518    08/02/23  0514   08/02/23  0322   08/01/23  2144        Albumin       3.3   3.3       Alkaline Phosphatase       66   62       Allens Test N/A     N/A           ALT       23   25       Anion Gap       Unable to calculate   11       Aniso       Slight         Appearance, UA   Clear             AST       53   48       Bacteria, UA   Rare             Baso #       0.02         Basophil %       0.1         Bilirubin (UA)   Negative             BILIRUBIN TOTAL       0.6  Comment: For infants and newborns, interpretation of results should be based  on gestational age, weight and in agreement with clinical  observations.    Premature Infant recommended reference ranges:  Up to 24 hours.............<8.0 mg/dL  Up to 48 hours............<12.0 mg/dL  3-5 days..................<15.0 mg/dL  6-29 days.................<15.0 mg/dL     0.8  Comment: For infants and newborns, interpretation of results should be based  on gestational age, weight and in agreement with clinical  observations.    Premature Infant recommended reference ranges:  Up to 24 hours.............<8.0 mg/dL  Up to 48 hours............<12.0 mg/dL  3-5 days..................<15.0 mg/dL  6-29 days.................<15.0 mg/dL         Site Javed/UAC     Javed/UAC           BUN       13   14       Nabil/Echinocytes       Occasional         Calcium       8.8   8.9       Chloride       >130  Comment: *Critical value notification by mjb__ with confirmation of receipt to  Latesha Farley RN___ at  Date [id number] Time 0443     129  Comment: *Critical value notification by EIC with confirmation of receipt to   LATESHA FARLEY RN at Date 8/1/23 Time 22:40         CO2       17   19       Color, UA   Colorless             Creatinine       0.7   0.7       DelSys Adult Vent     Adult Vent           Differential Method       Automated         eGFR       >60.0   >60.0       Eos #       0.0         Eosinophil %       0.1         FiO2 40     40           Glucose       163   182        Glucose, UA   Negative             Gran # (ANC)       12.7         Gran %       88.3         Hematocrit       37.2         Hemoglobin       12.2         Immature Grans (Abs)       0.06  Comment: Mild elevation in immature granulocytes is non specific and   can be seen in a variety of conditions including stress response,   acute inflammation, trauma and pregnancy. Correlation with other   laboratory and clinical findings is essential.           Immature Granulocytes       0.4         Ketones, UA   Negative             Leukocytes, UA   Negative             Lymph #       0.6         Lymph %       4.2         Magnesium       2.6         MCH       29.8         MCHC       32.8         MCV       91         Microscopic Comment   SEE COMMENT  Comment: Other formed elements not mentioned in the report are not   present in the microscopic examination.                Mode AC/PRVC     AC/PRVC           Mono #       1.0         Mono %       6.9         MPV       10.9         NITRITE UA   Negative             nRBC       0         Occult Blood UA   2+             PEEP 3     3           pH, UA   7.0             Phosphorus       2.6         Platelet Estimate       Appears normal         Platelets       166         POC BE -8     -7           POC HCO3 16.8     17.3           POC Hematocrit 34     UNAVAILABLE           POC Ionized Calcium 1.28     1.29           POC PCO2 26.7     26.4           POC PH 7.407     7.424           POC PO2 81     73           Potassium, Blood Gas 3.0     UNAVAILABLE           POC SATURATED O2 96     95           Sodium, Blood Gas 176     >180           POC TCO2 18     18           Poikilocytosis       Slight         Poly       Occasional         Potassium       3.1   3.7       PROTEIN TOTAL       5.9   5.9       Protein, UA   Negative  Comment: Recommend a 24 hour urine protein or a urine   protein/creatinine ratio if globulin induced proteinuria is  clinically suspected.               Rate 14     14            RBC       4.09         RBC, UA   2             RDW       14.0         Sample ARTERIAL     ARTERIAL           Sodium       176  Comment: *Critical value notification by helga__ with confirmation of receipt to  Latesha Farley RN___ at Date [id number] Time 0443     159       Sp02 97     96           Specific [place], UA   1.005             Specimen UA   Urine, Catheterized             Squam Epithel, UA   0             Teardrop Cells       Occasional         Troponin I       0.084  Comment: The reference interval for Troponin I represents the 99th percentile   cutoff   for our facility and is consistent with 3rd generation assay   performance.           Vt 300     300           WBC, UA   0             WBC       14.40                                Significant Diagnostics:  CT: CT Head Without Contrast    Result Date: 8/1/2023  Stable extensive subarachnoid hemorrhage.  Similar prominent midline shift to the left measuring 13 mm at the level of 3rd ventricle.  Similar mild lateral ventricular dilatation with intraventricular hemorrhage. Electronically signed by: Maicol Melgar Date:    08/01/2023 Time:    10:35   MRI: No results found in the last 24 hours.

## 2023-08-02 NOTE — HOSPITAL COURSE
8/2/2023: Overnight, pt with low BP so levo maxed out at 1. This AM, MAPs in 40s - started on vaso gtt and epi gtt as well, PRN mara bumps. Also found to be in DI (Na 137 > 159 > 176; Cl 105 > 129 > 130), hypertonic saline D/C; given D5w bolus x 2 and started D5w infusion; given 2 mcg of DDAVP. Also has L pneumothorax, PEEP decreased to 3, saturating well on vent. GOC discussions continue with family, will discuss comfort care versus brain death amongst themselves this morning.    Patient subsequently declared brain dead in the afternoon, kept intubated until family could arrive and then subsequently terminally extubated

## 2023-08-02 NOTE — OP NOTE
DATE OF PROCEDURE: 8/1/2023     PREOPERATIVE DIAGNOSES:   Subarachnoid hemorrhage [I60.9]  Ruptured aneurysm  Malignant cerebral edema  Intracerebral and intraventricular hemorrhage  Hydrocephalus    POSTOPERATIVE DIAGNOSES:   Subarachnoid hemorrhage [I60.9]    PROCEDURES PERFORMED:   1.  Right decompressive hemicraniectomy      Attending Neurosurgeon: Efren Mcintyre D.O.     First Assistant: Long Santos M.D.     Anesthesia: General       Level of Attending Involvement: Full      Indications:     83F presents from [facility] with diffuse SAH likely due to ruptured right pcom aneurysm with large right temporal intracerebral hemorrhage and intraventricular extension.  She has obstructive hydrocephalus and malignant cerebral edema.  Her GCS upon presentation was 3 T and improved to 40 after reversal of paralytics.  Extensive discussion was held with the patient's family and it was made clear that the patient's prognosis was very poor.  However the  wanted to proceed with surgery to give her the best chance at recovery.  She was therefore taken for an emergent right-sided decompressive hemicraniectomy.   Consents were obtained from the .    Procedure in detail:    The patient was taken to the operating room already intubated from ICU. He was positioned supine on the operating room table with a shoulder bump placed beneath the ipsilateral shoulder and the head turned to the contralateral side, exposing the ipsilateral side of the scalp. A safety pause was performed with the intended procedure and site of surgery stated and confirmed. The hair was clipped and the skin was cleaned, prepped and draped in normal sterile fashion.      A right-sided zvzboc-lyrhusr-[id number] horse-shoe incision from the midline to the tragus was planned. An incision was made using a #10 blade. This was carried down to the level of the periosteum and through the temporalis fascia and muscle using bovie cautery. The scalp was then reflected  anteriorly using a ira-osteal elevator and retracted with fish-hook retractors.        Frontal, parietal and temporal kentrell-holes were placed using a  and the underlying dura was stripped with Penfield #3  And #1 dissectors. The craniectomy was performed using a craniotome and the bone flap was removed. The operative space was irrigated with normal saline solution. The craniectomy was extended down to the floor of the middle fossa by removing the remainder of the temporal bone using rongeurs.       Dura was opened in a C-shaped fashion using a #15 blade and metzenbaum scissors which revealed swollen brain.  Hemostasis was obtained using a combination of floseal, gel foam with thrombin, and bipolar cautery. A layer dura-gen was placed directly under the dura, which was flapped over. A medium FERNANDO was placed in the epidural space and tunneled postero-medially. Galea was approximated using 2-0 vicryl sutures in interrupted fashion Skin was closed using staples. The patient was taken back to the Intensive Care Unit intubated.       Estimated blood loss: 100cc     Incision:  Right cranium     Wound classification:  Clean     Drains: FERNANDO     Needle/sponge count: Correct      Prognosis:  Guarded     Condition: Stable     Wound: Clean

## 2023-08-02 NOTE — HOSPITAL COURSE
8/2: OR yesterday for decompressive craniectomy. Cth this morning with improvement in midline shift and brain stem compression. Continued very poor exam.

## 2023-08-02 NOTE — SUBJECTIVE & OBJECTIVE
Review of Systems   Unable to perform ROS: Intubated     Objective:     Vitals:  Temp: (!) 95.5 °F (35.3 °C)  Pulse: 79  Rhythm: normal sinus rhythm  BP: 115/64  MAP (mmHg): 83  ICP Mean (mmHg): 11 mmHg  Resp: 14  SpO2: 100 %  Oxygen Concentration (%): 40  Vent Mode: A/C  Set Rate: 14 BPM  Vt Set: 300 mL  PEEP/CPAP: 3 cmH20  Peak Airway Pressure: 14 cmH20  Mean Airway Pressure: 5.3 cmH20  Plateau Pressure: 0 cmH20    Temp  Min: 87.4 °F (30.8 °C)  Max: 98.4 °F (36.9 °C)  Pulse  Min: 45  Max: 146  BP  Min: 78/53  Max: 144/74  MAP (mmHg)  Min: 61  Max: 103  ICP Mean (mmHg)  Min: 2 mmHg  Max: 36 mmHg  Resp  Min: 13  Max: 30  SpO2  Min: 94 %  Max: 100 %  Oxygen Concentration (%)  Min: 30  Max: 50    08/01 0701 - 08/02 0700  In: 4200.2 [I.V.:1359.5]  Out: 4317 [Urine:4175; Drains:142]   Unmeasured Output  Stool Occurrence: 0        Physical Exam  Vitals and nursing note reviewed.   Constitutional:       Comments: Intubated   Eyes:      Comments: Pupils fixed and dilated.   Neck:      Comments: C-collar no longer present  Pulmonary:      Comments: On mechanical ventilation  Neurological:      Comments: GCS E1 V(NT) M(1)  No withdrawal to pain in any extremity       Unable to test orientation, language, memory, judgment, insight, fund of knowledge, hearing, shoulder shrug, tongue protrusion, coordination, gait due to level of consciousness.       Medications:  Continuousdextrose 5 % (D5W), Last Rate: 125 mL/hr at 08/02/23 1100  EPINEPHrine, Last Rate: Stopped (08/02/23 0945)  NORepinephrine bitartrate-D5W, Last Rate: 0.08 mcg/kg/min (08/02/23 1100)  vasopressin, Last Rate: 0.04 Units/min (08/02/23 1100)    ScheduledceFAZolin (ANCEF) IVPB, 1 g, Q8H  EPINEPHrine (PF), ,   famotidine (PF), 20 mg, Daily  mupirocin, , BID  ondansetron, 4 mg, Once  phenylephrine HCl in 0.9% NaCl, ,   senna-docusate 8.6-50 mg, 1 tablet, BID    PRNEPINEPHrine (PF), ,   labetalol, 10 mg, Q6H PRN  magnesium oxide, 800 mg, PRN  magnesium oxide, 800  mg, PRN  metoprolol, 5 mg, Q5 Min PRN  phenylephrine HCl in 0.9% NaCl, ,   potassium bicarbonate, 35 mEq, PRN  potassium bicarbonate, 50 mEq, PRN  potassium bicarbonate, 60 mEq, PRN  potassium, sodium phosphates, 2 packet, PRN  potassium, sodium phosphates, 2 packet, PRN  potassium, sodium phosphates, 2 packet, PRN  sodium chloride 0.9%, 10 mL, PRN      Today I personally reviewed pertinent medications, lines/drains/airways, imaging, laboratory results, notably: elevated Na    Diet  Diet NPO

## 2023-08-02 NOTE — PLAN OF CARE
[facility] Care Plan    POC reviewed with Sho PATEL Lennon and family at 1400. Family verbalized understanding. Questions and concerns addressed. Will continue to monitor. See below and flowsheets for full assessment and VS info.     Patient hypotensive this morning; levo restarted, vasopressin added.  Sodium critically high this morning; DDAVP given, D5 bolus given, sodium now high but not critical.  Due to poor exam and significant injury, brain death studies performed this afternoon. Patient confirmed brain dead by Dr. Ty; see death note.    Is this a stroke patient? yes- Stroke booklet reviewed with family, risk factors identified for patient and stroke booklet remains at bedside for ongoing education.     Neuro:  [place] Coma Scale  Best Eye Response: 1-->(E1) none  Best Motor Response: 1-->(M1) none  Best Verbal Response: 1-->(V1) none  [place] Coma Scale Score: 3  Assessment Qualifiers: patient intubated  Pupil PERRLA: no     24 hr Temp:  [87.4 °F (30.8 °C)-98.4 °F (36.9 °C)]     CV:   Rhythm: normal sinus rhythm  BP goals:   SBP < 140  MAP > 65    Resp:      Vent Mode: A/C  Set Rate: 14 BPM  Oxygen Concentration (%): 100  Vt Set: 300 mL  PEEP/CPAP: 3 cmH20    Plan: N/A    GI/:     Diet/Nutrition Received: NPO  Last Bowel Movement: 07/31/23  Voiding Characteristics: urethral catheter (bladder)    Intake/Output Summary (Last 24 hours) at 8/2/2023 1452  Last data filed at 8/2/2023 1400  Gross per 24 hour   Intake 6494.19 ml   Output 4591 ml   Net 1903.19 ml     Unmeasured Output  Stool Occurrence: 0    Labs/Accuchecks:  Recent Labs   Lab 08/02/23  0322 08/02/23  0514 08/02/23  1407   WBC 14.40*  --   --    RBC 4.09  --   --    HGB 12.2  --   --    HCT 37.2   < > 28*     --   --     < > = values in this interval not displayed.      Recent Labs   Lab 08/02/23  1206   *   K 2.9*   CO2 17*   CL >130*   BUN 14   CREATININE 1.2   ALKPHOS 50*   ALT 20   AST 44*   BILITOT 0.3      Recent Labs   Lab  08/01/23  0622   INR 1.0   APTT 23.8      Recent Labs   Lab 08/02/23  0322   TROPONINI 0.084*       Electrolytes: Electrolytes replaced  Accuchecks: none    Gtts:   dextrose 5 % (D5W) Stopped (08/02/23 1333)    EPINEPHrine Stopped (08/02/23 0945)    NORepinephrine bitartrate-D5W 0.12 mcg/kg/min (08/02/23 1419)    vasopressin 0.04 Units/min (08/02/23 1400)       LDA/Wounds:  Lines/Drains/Airways       Central Venous Catheter Line  Duration             Percutaneous Central Line Insertion/Assessment - Triple Lumen  08/01/23 1700 Subclavian Left <1 day              Drain  Duration                  ICP/Ventriculostomy 08/01/23 Ventricular drainage catheter with ICP microsensor Left 1 day         NG/OG Tube 08/01/23 0959 Center mouth 1 day         Closed/Suction Drain 08/01/23 1713 Right Scalp Accordion 10 Fr. <1 day         Urethral Catheter 08/01/23 1900 <1 day              Airway  Duration                  Airway - Non-Surgical 08/01/23 0555 Endotracheal Tube 1 day              Arterial Line  Duration             Arterial Line 08/01/23 1830 Right Brachial <1 day              Peripheral Intravenous Line  Duration                  Peripheral IV - Single Lumen 08/01/23 0959 Anterior;Right Upper Arm 1 day                  Wounds: No  Wound care consulted: No

## 2023-08-02 NOTE — ASSESSMENT & PLAN NOTE
Neurogenic shock  On mechanically assisted ventilation  Brain herniation  Vasogenic brain edema  IVH (intraventricular hemorrhage)  [place] coma scale total score 3-8  Hypertensive emergency (on admission)    Found unresponsive the morning of 8/1. Intubated 8/1/2023 d/t GCS 3, initially on propofol which was discontinued later that morning. Patient hypertensive with SBP in 200s, transferred on Cardene gtt. CTH: extensive SAH with prominent 13 mm left midline shift. 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 GOC discussion held with family, who decided to pursue treatment at this time. S/p mannitol 50 g IV x 1 & Bridion 100 mg IV x 1 in the ED on 8/1. Overnight patient became hypotensive with increasing BP support requirements.    - NSGY following s/p right craniectomy with EVD placement on 8/1  - Cardene gtt stopped as patient now in neurogenic shock with low MAPs (see below)   - Continue levo, vaso, & epi gtt for BP support  - D/C 3% hypertonic saline  - q1h neuro checks  - GOC held with daughter this AM, she has informed the rest of the family, they are waiting for the patient's  to arrive at the hospital before deciding next steps (options presented were comfort care versus brain death proclamation)

## 2023-08-02 NOTE — PROGRESS NOTES
[facility]  Neurosurgery  Progress Note    Subjective:     History of Present Illness: 83F presents from OSH with ruptured aneurysmal SAH.    Unable to gather patient history, intubated and unresponsive. HPI per earlier notes:  83-year-old female who presents as a transfer from an outside facility.  Found unresponsive on her bathroom floor with hypertension.  Seen at outside hospital where she was intubated for GCS 15.  Found to have a very large intracranial bleed.  She was started on nicardipine.  Intubated at around 5:00 a.m..  Has been on propofol and Cardizem with last blood pressure in the 120s for EMS.  Has not had any movement or response to pain.  They do report a bruise on the left shoulder secondary to the fall.      Post-Op Info:  Procedure(s) (LRB):  CRANIECTOMY (right); w/ EVD PLACEMENT (CLASS A) (Right)   1 Day Post-Op     Interval history: 8/2: OR yesterday for decompressive craniectomy. Cth this morning with improvement in midline shift and brain stem compression. Continued very poor exam.       Objective:     Weight: 50 kg (110 lb 3.7 oz)  Body mass index is 22.26 kg/m².  Vital Signs (Most Recent):  Temp: 97.3 °F (36.3 °C) (08/02/23 0601)  Pulse: 107 (08/02/23 0601)  Resp: 18 (08/02/23 0601)  BP: 112/62 (08/02/23 0601)  SpO2: 97 % (08/02/23 0601) Vital Signs (24h Range):  Temp:  [87.4 °F (30.8 °C)-97.3 °F (36.3 °C)] 97.3 °F (36.3 °C)  Pulse:  [] 107  Resp:  [13-30] 18  SpO2:  [94 %-100 %] 97 %  BP: ()/(50-75) 112/62  Arterial Line BP: ()/(35-62) 121/51                Vent Mode: A/C  Oxygen Concentration (%):  [30-50] 40  Resp Rate Total:  [14 br/min-26 br/min] 19 br/min  Vt Set:  [300 mL-400 mL] 300 mL  PEEP/CPAP:  [3 cmH20-5 cmH20] 3 cmH20  Mean Airway Pressure:  [5.1 cmH20-7.5 cmH20] 5.7 cmH20             NG/OG Tube 08/01/23 0959 Center mouth (Active)            Urethral Catheter 08/01/23 0600 (Active)          Physical Exam     E1VTM3.   Pupils fixed,  dilated, 7mm.   No corneal. +cough.   RUE extensor  LUE no movement  BLE min WD    Neurosurgery Physical Exam    Significant Labs:  Recent Labs   Lab 08/01/23  1032 08/01/23  2144 08/02/23 0322   * 182* 163*    159* 176*   K 3.5 3.7 3.1*    129* >130*   CO2 18* 19* 17*   BUN 18 14 13   CREATININE 0.7 0.7 0.7   CALCIUM 9.2 8.9 8.8   MG  --   --  2.6       Recent Labs   Lab 08/01/23  0622 08/01/23  1032 08/02/23  0322 08/02/23  0514 08/02/23  0530   WBC 12.79* 18.33* 14.40*  --   --    HGB 13.0 12.3 12.2  --   --    HCT 38.8 36.6* 37.2 UNAVAILABLE 34*    219 166  --   --        Recent Labs   Lab 08/01/23 0622   INR 1.0   APTT 23.8       Microbiology Results (last 7 days)       ** No results found for the last 168 hours. **          Recent Lab Results  (Last 5 results in the past 24 hours)        08/02/23  0530   08/02/23  0518   08/02/23  0514   08/02/23 0322 08/01/23 2144        Albumin       3.3   3.3       Alkaline Phosphatase       66   62       Allens Test N/A     N/A           ALT       23   25       Anion Gap       Unable to calculate   11       Aniso       Slight         Appearance, UA   Clear             AST       53   48       Bacteria, UA   Rare             Baso #       0.02         Basophil %       0.1         Bilirubin (UA)   Negative             BILIRUBIN TOTAL       0.6  Comment: For infants and newborns, interpretation of results should be based  on gestational age, weight and in agreement with clinical  observations.    Premature Infant recommended reference ranges:  Up to 24 hours.............<8.0 mg/dL  Up to 48 hours............<12.0 mg/dL  3-5 days..................<15.0 mg/dL  6-29 days.................<15.0 mg/dL     0.8  Comment: For infants and newborns, interpretation of results should be based  on gestational age, weight and in agreement with clinical  observations.    Premature Infant recommended reference ranges:  Up to 24 hours.............<8.0 mg/dL  Up to  48 hours............<12.0 mg/dL  3-5 days..................<15.0 mg/dL  6-29 days.................<15.0 mg/dL         Site Javed/UAC     Javed/UAC           BUN       13   14       Homer/Echinocytes       Occasional         Calcium       8.8   8.9       Chloride       >130  Comment: *Critical value notification by mjb__ with confirmation of receipt to  Latesha Farley RN___ at  Date [id number] Time 0443     129  Comment: *Critical value notification by EIC with confirmation of receipt to   LATESHA FARLEY RN at Date 8/1/23 Time 22:40         CO2       17   19       Color, UA   Colorless             Creatinine       0.7   0.7       DelSys Adult Vent     Adult Vent           Differential Method       Automated         eGFR       >60.0   >60.0       Eos #       0.0         Eosinophil %       0.1         FiO2 40     40           Glucose       163   182       Glucose, UA   Negative             Gran # (ANC)       12.7         Gran %       88.3         Hematocrit       37.2         Hemoglobin       12.2         Immature Grans (Abs)       0.06  Comment: Mild elevation in immature granulocytes is non specific and   can be seen in a variety of conditions including stress response,   acute inflammation, trauma and pregnancy. Correlation with other   laboratory and clinical findings is essential.           Immature Granulocytes       0.4         Ketones, UA   Negative             Leukocytes, UA   Negative             Lymph #       0.6         Lymph %       4.2         Magnesium       2.6         MCH       29.8         MCHC       32.8         MCV       91         Microscopic Comment   SEE COMMENT  Comment: Other formed elements not mentioned in the report are not   present in the microscopic examination.                Mode AC/PRVC     AC/PRVC           Mono #       1.0         Mono %       6.9         MPV       10.9         NITRITE UA   Negative             nRBC       0         Occult Blood UA   2+             PEEP 3     3            pH, UA   7.0             Phosphorus       2.6         Platelet Estimate       Appears normal         Platelets       166         POC BE -8     -7           POC HCO3 16.8     17.3           POC Hematocrit 34     UNAVAILABLE           POC Ionized Calcium 1.28     1.29           POC PCO2 26.7     26.4           POC PH 7.407     7.424           POC PO2 81     73           Potassium, Blood Gas 3.0     UNAVAILABLE           POC SATURATED O2 96     95           Sodium, Blood Gas 176     >180           POC TCO2 18     18           Poikilocytosis       Slight         Poly       Occasional         Potassium       3.1   3.7       PROTEIN TOTAL       5.9   5.9       Protein, UA   Negative  Comment: Recommend a 24 hour urine protein or a urine   protein/creatinine ratio if globulin induced proteinuria is  clinically suspected.               Rate 14     14           RBC       4.09         RBC, UA   2             RDW       14.0         Sample ARTERIAL     ARTERIAL           Sodium       176  Comment: *Critical value notification by helga__ with confirmation of receipt to  Latesha Farley RN___ at Date [id number] Time 0443     159       Sp02 97     96           Specific [place], UA   1.005             Specimen UA   Urine, Catheterized             Squam Epithel, UA   0             Teardrop Cells       Occasional         Troponin I       0.084  Comment: The reference interval for Troponin I represents the 99th percentile   cutoff   for our facility and is consistent with 3rd generation assay   performance.           Vt 300     300           WBC, UA   0             WBC       14.40                                Significant Diagnostics:  CT: CT Head Without Contrast    Result Date: 8/1/2023  Stable extensive subarachnoid hemorrhage.  Similar prominent midline shift to the left measuring 13 mm at the level of 3rd ventricle.  Similar mild lateral ventricular dilatation with intraventricular hemorrhage. Electronically signed by: Maicol  Melgar Date:    08/01/2023 Time:    10:35   MRI: No results found in the last 24 hours.    Assessment/Plan:     * Acute spontaneous subarachnoid intracranial hemorrhage due to cerebral aneurysm  Sho Lennon is a 83 y.o. female presenting with [id number] aSAH. Ruptured pcomm aneurysm.     CTH/CTA 8/1: F4 SAH w/ large sylvian clot & 13mm MLS. HCP. PcommA aneurysm w/ extrav    Now s/p right decompressive hemicraniectomy and left sided EVD.     CTH 8/1 post op: EVD In good position, good decompression, MLS improved to 6-7 mm, mibrain compression improved    -- ICU status  -- q1 hour neurochecks  -- aneurysm unsecured   -- SBP < 140   -- keppra   -- Nimotop   -- Strict I/O, goal euvolemia to net positive  -- EVD in at 15, monitor ICP and output  -- Na > 145   -- Recommend early GOC discussion given very poor prognosis  -- If any clinical improvement, will consider angiogram        Andre Chun MD  Neurosurgery  [facility]

## 2023-08-02 NOTE — ASSESSMENT & PLAN NOTE
Cr increased by 0.5 (0.6 > 1.1)    - Will continue to monitor until family makes GOC decision  - IVF

## 2023-08-02 NOTE — PLAN OF CARE
[facility] Care Plan    POC reviewed with Sho Lennon and family at 0300. Pt verbalized understanding. Questions and concerns addressed. No acute events overnight. Pt progressing toward goals. Will continue to monitor. See below and flowsheets for full assessment and VS info.   -EVD open @ 20. ICP 10-14. CSF 5-7.   -cardene gtt titrated per order   -D5 infusing @ 100ml/hr   -1L NS bolus given   -500ml D5 bolus given   -bear hugger in use   -metoprolol given x1     Is this a stroke patient? yes- Stroke booklet reviewed with patient and family, risk factors identified for patient and stroke booklet remains at bedside for ongoing education.     Neuro:  [place] Coma Scale  Best Eye Response: 1-->(E1) none  Best Motor Response: 4-->(M4) withdraws from pain  Best Verbal Response: 1-->(V1) none  [place] Coma Scale Score: 6  Assessment Qualifiers: patient intubated  Pupil PERRLA: no     24hr Temp:  [87.4 °F (30.8 °C)-96.8 °F (36 °C)]     CV:   Rhythm: sinus tachycardia  BP goals:   SBP < 140  MAP > 65    Resp:      Vent Mode: A/C  Set Rate: 14 BPM  Oxygen Concentration (%): 40  Vt Set: 300 mL  PEEP/CPAP: 5 cmH20    Plan: wean to extubate    GI/:     Diet/Nutrition Received: NPO  Last Bowel Movement: 07/31/23  Voiding Characteristics: urethral catheter (bladder)    Intake/Output Summary (Last 24 hours) at 8/2/2023 0627  Last data filed at 8/2/2023 0601  Gross per 24 hour   Intake 2428.46 ml   Output 3897 ml   Net -1468.54 ml     Unmeasured Output  Stool Occurrence: 0    Labs/Accuchecks:  Recent Labs   Lab 08/02/23  0322 08/02/23  0514 08/02/23  0530   WBC 14.40*  --   --    RBC 4.09  --   --    HGB 12.2  --   --    HCT 37.2   < > 34*     --   --     < > = values in this interval not displayed.      Recent Labs   Lab 08/02/23  0322   *   K 3.1*   CO2 17*   CL >130*   BUN 13   CREATININE 0.7   ALKPHOS 66   ALT 23   AST 53*   BILITOT 0.6      Recent Labs   Lab 08/01/23  0622   INR 1.0   APTT 23.8      Recent Labs    Lab 08/01/23  1032   TROPONINI 0.026       Electrolytes: N/A - electrolytes WDL  Accuchecks: none    Gtts:   sodium chloride 0.9% 100 mL/hr at 08/02/23 0211    nicardipine Stopped (08/02/23 0201)    NORepinephrine bitartrate-D5W 0.06 mcg/kg/min (08/02/23 0211)       LDA/Wounds:  Lines/Drains/Airways       Central Venous Catheter Line  Duration             Percutaneous Central Line Insertion/Assessment - Triple Lumen  08/01/23 1700 Subclavian Left <1 day              Drain  Duration                  ICP/Ventriculostomy 08/01/23 Ventricular drainage catheter with ICP microsensor Left 1 day         Closed/Suction Drain 08/01/23 1713 Right Scalp Accordion 10 Fr. <1 day         NG/OG Tube 08/01/23 0959 Center mouth <1 day         Urethral Catheter 08/01/23 1900 <1 day              Airway  Duration                  Airway - Non-Surgical 08/01/23 0555 Endotracheal Tube <1 day              Arterial Line  Duration             Arterial Line 08/01/23 1830 Right Brachial <1 day              Peripheral Intravenous Line  Duration                  Peripheral IV - Single Lumen 08/01/23 0540 18 G Left Antecubital <1 day         Peripheral IV - Single Lumen 08/01/23 0959 Anterior;Right Upper Arm <1 day                  Wounds: Yes  Wound care consulted: No      Problem: Adult Inpatient Plan of Care  Goal: Plan of Care Review  Flowsheets (Taken 8/2/2023 0405)  Plan of Care Reviewed With: patient     Problem: Adjustment to Illness (Stroke, Hemorrhagic)  Goal: Optimal Coping  Intervention: Support Psychosocial Response to Stroke  Flowsheets (Taken 8/2/2023 0405)  Supportive Measures:   positive reinforcement provided   relaxation techniques promoted  Family/Support System Care: support provided     Problem: Cerebral Tissue Perfusion (Stroke, Hemorrhagic)  Goal: Optimal Cerebral Tissue Perfusion  Intervention: Protect and Optimize Cerebral Perfusion  Flowsheets (Taken 8/2/2023 0405)  Sensory Stimulation Regulation:   care clustered    lighting decreased   quiet environment promoted   visual stimulation minimized  Cerebral Perfusion Promotion: blood pressure monitored  Fluid/Electrolyte Management: fluids provided  Stabilization Measures: airway opened

## 2023-08-02 NOTE — SIGNIFICANT EVENT
Patient with large intraparenchymal hemorrhage from right supraclinoid aneurysm with significant herniation on admission with brainstem compression  No improvement after evacuation and craniectomy  Developed DI and hemodynamic instability later that evening  Sodium and other electrolytes corrected this morning  Payient normothermic  Coma  Pupils 5.7mm bilat with no reaction on pupillometer  No corneal response  No oculocephalics or oculocalorics at 45 degree HOB elevation  No gag or cough  No movement in limbs directly or with supraorbital pressure  Apnea test performed  pCO2 increased from 36mmHg to 64mmHg  Patient declared brain dead at 2:37pm

## 2023-08-03 LAB
BLD PROD TYP BPU: NORMAL
BLD PROD TYP BPU: NORMAL
BLOOD UNIT EXPIRATION DATE: NORMAL
BLOOD UNIT EXPIRATION DATE: NORMAL
BLOOD UNIT TYPE CODE: 5100
BLOOD UNIT TYPE CODE: 9500
BLOOD UNIT TYPE: NORMAL
BLOOD UNIT TYPE: NORMAL
CODING SYSTEM: NORMAL
CODING SYSTEM: NORMAL
CROSSMATCH INTERPRETATION: NORMAL
CROSSMATCH INTERPRETATION: NORMAL
DISPENSE STATUS: NORMAL
DISPENSE STATUS: NORMAL
NUM UNITS TRANS FFP: NORMAL
NUM UNITS TRANS FFP: NORMAL

## 2023-08-03 NOTE — DISCHARGE SUMMARY
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] 08/02/2023 Yes    Neurogenic shock [R57.8] 08/02/2023 Yes    Acute renal failure [N17.9] 08/02/2023 Yes    Vasogenic brain edema [G93.6] 08/02/2023 Yes    IVH (intraventricular hemorrhage) [I61.5] 08/02/2023 Yes    Nicolas coma scale total score 3-8 [R40.2430] 08/01/2023 Yes    On mechanically assisted ventilation [Z99.11] 08/01/2023 Not Applicable    Brain herniation [G93.5] 08/01/2023 Yes    Hypertensive emergency [I16.1] 08/01/2023 Yes    Hypothyroid [E03.9] 08/29/2019 Unknown      Problems Resolved During this Admission:      History of Present Illness: Sho Lennon is an 84 yo F with HTN and hypothyroidism who was transferred from [facility] due to an extensive intracranial hemorrhage. This morning she was found on the bathroom floor unresponsive, GCS of 6 with EMS but 3 on arrival to [place] ED. EMS also reported SBP in there 200s and signs of trauma to the shoulder. CTH at [place] showed "large intraparenchymal hematoma occupying the right basal ganglia area dimensions of at least 4.4 x 5.4 x 7.9 cm. This hemorrhage has intraventricular and subarachnoid extension of the large volume of blood noted throughout the basilar subarachnoid space and the right temporal fossa region...some interhemispheric subarachnoid blood as well." CTA showed "Closely associated with the supraclinoid right ICA and " "extending posteriorly, an expected location of the right posterior communicating artery, is amorphous collection of contrast measuring 10 x 9 mm, suspicious for a ruptured aneurysm." At [place], underwent RSI with succinylcholine 70 mg and etomidate 20 mg; sedated with propofol. Nicardipine started for BP management. The decision was made to transfer to [facility].    Upon arrival to the [facility] ED: patient remained intubated, propofol was discontinued. On exam, pupils were fixed and dilated; no response to pain. Repeat CTH shows "stable extensive subarachnoid hemorrhage...prominent midline shift to the left measuring 13 mm at the level of 3rd ventricle...mild lateral ventricular dilatation with intraventricular hemorrhage." Given mannitol 50 g IV x 1 & Bridion 100 mg IV x 1.       Hospital Course by Event: 8/2/2023: Overnight, pt with low BP so levo maxed out at 1. This AM, MAPs in 40s - started on vaso gtt and epi gtt as well, PRN mara bumps. Also found to be in DI (Na 137 > 159 > 176; Cl 105 > 129 > 130), hypertonic saline D/C; given D5w bolus x 2 and started D5w infusion; given 2 mcg of DDAVP. Also has L pneumothorax, PEEP decreased to 3, saturating well on vent. GOC discussions continue with family, will discuss comfort care versus brain death amongst themselves this morning.    Patient subsequently declared brain dead in the afternoon, kept intubated until family could arrive and then subsequently terminally extubated      Hospital Course by Problem:   * Acute spontaneous subarachnoid intracranial hemorrhage due to cerebral aneurysm  Neurogenic shock  On mechanically assisted ventilation  Brain herniation  Vasogenic brain edema  IVH (intraventricular hemorrhage)  Nicolas coma scale total score 3-8  Hypertensive emergency (on admission)    Found unresponsive the morning of 8/1. Intubated 8/1/2023 d/t GCS 3, initially on propofol which was discontinued later that morning. Patient hypertensive with SBP in " 200s, transferred on Cardene gtt. CTH: extensive SAH with prominent 13 mm left midline shift. 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 GOC discussion held with family, who decided to pursue treatment at this time. S/p mannitol 50 g IV x 1 & Bridion 100 mg IV x 1 in the ED on 8/1. Overnight patient became hypotensive with increasing BP support requirements.    - NSGY following s/p right craniectomy with EVD placement on 8/1  - Cardene gtt stopped as patient now in neurogenic shock with low MAPs (see below)   - Continue levo, vaso, & epi gtt for BP support  - D/C 3% hypertonic saline  - q1h neuro checks  - GOC held with daughter this AM, she has informed the rest of the family, they are waiting for the patient's  to arrive at the hospital before deciding next steps then declared brain during brain death studies this afternoon              Acute renal failure  Cr increased by 0.5 (0.6 > 1.1) on arrival       Diabetes insipidus  Na 137 > 159 > 176; Cl 105 > 129 > 130    - hypertonic saline D/C  - given D5W bolus x 2 and started D5w infusion  - given 2 mcg of DDAV    Hypothyroid  Most recent TSH 1.248 on 6/13/2023  -was on synthroid at home   -now declared brain dead        Goals of Care Treatment Preferences:  Code Status: Partial Code      Significant Results:  Imaging:  CT Head Without Contrast    Result Date: 8/1/2023    Interval postoperative changes of right-sided decompressive craniectomy, right subdural drain placement, left ventriculostomy catheter placement for  subarachnoid hemorrhage decompression.  Decreased leftward midline shift and left lateral ventricular dilatation.    New blood products overlying the right frontal lobe and extension of intraventricular blood into the frontal horns, likely representing redistribution of blood products.    Diffuse cerebral edema and evolving cerebral tonsillar herniation.  Continued short-term follow-up  suggested.    This report was flagged in Epic as abnormal.    Dr. Jean Baptiste discussed preliminary findings with Long Santos on 2023 at 21:02.    Electronically signed by resident: Perez Jean Baptiste  Date:    2023  Time:    19:41    Electronically signed by: Alex Sharma MD  Date:    2023  Time:    21:12      X-Ray Chest AP Portable    Result Date: 2023    This report was flagged in Epic as abnormal.    Interval moderate left pneumothorax.      Electronically signed by: Cesario Presley MD  Date:    2023  Time:    20:04      CT Head Without Contrast    Result Date: 2023    Stable extensive subarachnoid hemorrhage.  Similar prominent midline shift to the left measuring 13 mm at the level of 3rd ventricle.  Similar mild lateral ventricular dilatation with intraventricular hemorrhage.      Electronically signed by: Maicol Melgar  Date:    2023  Time:    10:35      Laboratory:  Lab Results   Component Value Date    HGBA1C 5.4 2023    CHOL 150 2023    HDL 62 2023    HDL 79 2019    LDLCALC 77.8 2023    LDLCALC 106 2019    TRIG 51 2023    TSH 0.550 2023       Pending Results: none    Consultations:  IP CONSULT TO NEUROSURGERY      Procedures:   Procedure(s) (LRB):  CRANIECTOMY (right); w/ EVD PLACEMENT (CLASS A) (Right) by Efren Mcintyre DO.        Discharged Condition:     This discharge took less than 30 minutes to complete.    Anitra Nagel PA-C  Neurocritical Care  [facility]

## 2023-08-03 NOTE — ASSESSMENT & PLAN NOTE
Na 137 > 159 > 176; Cl 105 > 129 > 130    - hypertonic saline D/C  - given D5W bolus x 2 and started D5w infusion  - given 2 mcg of DDAV

## 2023-08-03 NOTE — NURSING
Patient terminally extubated at 2050, family endorses understanding of events. Medications discontinued at this time as well. Documented time of brain death already on file. Cardiac death noted at 2108.  called to room to aid in consolation.

## 2023-08-03 NOTE — ANESTHESIA POSTPROCEDURE EVALUATION
Anesthesia Post Evaluation    Patient: Sho Lennon    Procedure(s) Performed: Procedure(s) (LRB):  CRANIECTOMY (right); w/ EVD PLACEMENT (CLASS A) (Right)    Final Anesthesia Type: general      Patient location during evaluation: PACU  Patient participation: No - Unable to Participate, Intubation  Level of consciousness: sedated  Pain management: adequate  Airway patency: patent    PONV status at discharge: No PONV  Anesthetic complications: no      Cardiovascular status: blood pressure returned to baseline and hemodynamically stable  Respiratory status: unassisted and ETT  Hydration status: euvolemic  Follow-up not needed.          Vitals Value Taken Time   BP 64/39 08/02/23 2103   Temp 38.7 °C (101.66 °F) 08/02/23 2121   Pulse 0 08/02/23 2121   Resp 0 08/02/23 2121   SpO2 10 % 08/02/23 2103   Vitals shown include unvalidated device data.      No case tracking events are documented in the log.      Pain/Dinoisio Score: No data recorded

## 2023-08-03 NOTE — PLAN OF CARE
[facility]  Discharge Final Note    Primary Care Provider: Sonal Lewis MD    Expected Discharge Date: 2023    Pt declared brain dead per MD notes.    Final Discharge Note (most recent)       Final Note - 23          Final Note    Assessment Type Final Discharge Note (P)      Anticipated Discharge Disposition  (P)                      Important Message from Medicare           Hiwot Britt LCSW  Neurocritical Care   [facility]  [id number]

## 2023-08-03 NOTE — ASSESSMENT & PLAN NOTE
Neurogenic shock  On mechanically assisted ventilation  Brain herniation  Vasogenic brain edema  IVH (intraventricular hemorrhage)  [place] coma scale total score 3-8  Hypertensive emergency (on admission)    Found unresponsive the morning of 8/1. Intubated 8/1/2023 d/t GCS 3, initially on propofol which was discontinued later that morning. Patient hypertensive with SBP in 200s, transferred on Cardene gtt. CTH: extensive SAH with prominent 13 mm left midline shift. 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 GOC discussion held with family, who decided to pursue treatment at this time. S/p mannitol 50 g IV x 1 & Bridion 100 mg IV x 1 in the ED on 8/1. Overnight patient became hypotensive with increasing BP support requirements.    - NSGY following s/p right craniectomy with EVD placement on 8/1  - Cardene gtt stopped as patient now in neurogenic shock with low MAPs (see below)   - Continue levo, vaso, & epi gtt for BP support  - D/C 3% hypertonic saline  - q1h neuro checks  - GOC held with daughter this AM, she has informed the rest of the family, they are waiting for the patient's  to arrive at the hospital before deciding next steps then declared brain during brain death studies this afternoon

## 2023-08-05 LAB
BLD PROD TYP BPU: NORMAL
BLOOD UNIT EXPIRATION DATE: NORMAL
BLOOD UNIT TYPE CODE: 5100
BLOOD UNIT TYPE: NORMAL
CODING SYSTEM: NORMAL
CROSSMATCH INTERPRETATION: NORMAL
DISPENSE STATUS: NORMAL
NUM UNITS TRANS PACKED RBC: NORMAL

## 2023-09-01 NOTE — ADDENDUM NOTE
Addendum  created 09/01/23 1151 by Leda Hermosillo MD    Attestation recorded in Intraprocedure, Clinical Note Signed, Intraprocedure Attestations filed

## (undated) DEVICE — DRESSING MEDIPORE CLTH 3.5X6IN

## (undated) DEVICE — CLOSURE SKIN STERI STRIP 1/2X4

## (undated) DEVICE — KIT SURGIFLO HEMOSTATIC MATRIX

## (undated) DEVICE — GLOVE PROTEXIS PI CLASSIC 7.5

## (undated) DEVICE — SOL 9P NACL IRR PIC IL

## (undated) DEVICE — DRESSING SURGICAL 1/2X1/2

## (undated) DEVICE — TRAY CATH FOL SIL URIMTR 16FR

## (undated) DEVICE — PACK CUSTOM UNIV BASIN SLI

## (undated) DEVICE — SUT 4/0 12IN PLAIN GUT M-1

## (undated) DEVICE — NDL SAFETY 21G X 1 1/2 ECLPSE

## (undated) DEVICE — MARKER SKIN STND TIP BLUE BARR

## (undated) DEVICE — HOOK LONE STAR BLUNT 12MM

## (undated) DEVICE — DRAPE STERI-DRAPE 1000 17X11IN

## (undated) DEVICE — DRAPE CORETEMP FLD WRM 56X62IN

## (undated) DEVICE — ROUTER TAPERED 2.3MM

## (undated) DEVICE — ELECTRODE REM PLYHSV RETURN 9

## (undated) DEVICE — CARTRIDGE OIL

## (undated) DEVICE — GLOVE SURG BIOGEL LATEX SZ 7.5

## (undated) DEVICE — BIT DRILL WIRE PASS 1.0MM

## (undated) DEVICE — CLIPS RANEY SCALP FFS/ASSY

## (undated) DEVICE — HEMOSTAT SURGICEL 4X8IN

## (undated) DEVICE — DRAPE STERI INSTRUMENT 1018

## (undated) DEVICE — KIT EXTERNAL DRAIN(CRAINIAL)

## (undated) DEVICE — SLEEVE SCD EXPRESS CALF MEDIUM

## (undated) DEVICE — SEE MEDLINE ITEM 152622

## (undated) DEVICE — SPONGE COTTON TRAY 4X4IN

## (undated) DEVICE — SUT VICRYL PLUS 3-0 SH 18IN

## (undated) DEVICE — DIFFUSER

## (undated) DEVICE — CORD BIPOLAR 12 FOOT

## (undated) DEVICE — GOWN X-LG STERILE BACK

## (undated) DEVICE — PAD GROUNDING NEONATE 6-30LBS

## (undated) DEVICE — BUR BONE CUT MICRO TPS 3X3.8MM

## (undated) DEVICE — SPRAY MASTISOL

## (undated) DEVICE — SUT 3-0 VICRYL / SH (J416)

## (undated) DEVICE — STRAP OR TABLE 5IN X 72IN

## (undated) DEVICE — DRAPE THYROID WITH ARMBOARD

## (undated) DEVICE — KIT DRAIN HEMOVAC 3/16IN 400ML

## (undated) DEVICE — SEE MEDLINE ITEM 146292

## (undated) DEVICE — TUBE FRAZIER 5MM 2FT SOFT TIP

## (undated) DEVICE — TRAY NEURO OMC

## (undated) DEVICE — DRESSING SURGICAL 1X3

## (undated) DEVICE — SUT MONOCYRL 4-0 PS2 UND

## (undated) DEVICE — KIT EVACUATOR 3-SPRING 1/8 DRN

## (undated) DEVICE — DRAPE BAG ISOLATION 20 X 20

## (undated) DEVICE — DURAPREP SURG SCRUB 26ML

## (undated) DEVICE — SPONGE PATTY SURGICAL .5X3IN

## (undated) DEVICE — SUT 5-0 MONO PLUS RB-1 UND

## (undated) DEVICE — SUT 4/0 18IN NUROLON BLK B

## (undated) DEVICE — LINER SUCTION 3000CC

## (undated) DEVICE — CONTAINER SPECIMEN STRL 4OZ

## (undated) DEVICE — BIT PERFORATOR LARGE